# Patient Record
Sex: FEMALE | Race: WHITE | Employment: UNEMPLOYED | URBAN - NONMETROPOLITAN AREA
[De-identification: names, ages, dates, MRNs, and addresses within clinical notes are randomized per-mention and may not be internally consistent; named-entity substitution may affect disease eponyms.]

---

## 2022-11-19 NOTE — PROGRESS NOTES
[address] [address]  Dept: [phone]  Dept Fax: [phone]  Loc: [phone]    Burnie Lanes is a 34 y.o. female who presents today for her medical conditions/complaints as noted below. Chief Complaint   Patient presents with    New Patient     Previous pt of Dr. Juan C Carcamo, pt states she has not seen him in 10 years        HPI:     Patient is here in the office today to establish care. 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 She states that she had a DEXA scan at that time that showed that she had osteoporosis. She is unsure the cause of her osteoporosis. Denies long-term steroid use. She states that she has not had a DEXA scan since that time. Patient also complains of a small white bump near the inner aspect of her left eyelid that has been present for about 3 months. Denies pain, itchiness, or inability to tear on that side. No drainage. She has not tried anything for this. Past Medical History:   Diagnosis Date    Abnormal Pap smear of cervix 01/01/2011    Osteoporosis       History reviewed. No pertinent surgical history.     Family History   Problem Relation Age of Onset    Diabetes Mother     No Known Problems Father     Diabetes Maternal Grandmother     High Blood Pressure Maternal Grandmother        Social History     Tobacco Use    Smoking status: Never    Smokeless tobacco: Never   Substance Use Topics    Alcohol use: No      No current outpatient medications on file. No current facility-administered medications for this visit. No Known Allergies    Health Maintenance   Topic Date Due    COVID-19 Vaccine (1) Never done    Varicella vaccine (1 of 2 - 2-dose childhood series) Never done    HIV screen  Never done    DTaP/Tdap/Td vaccine (1 - Tdap) Never done    Cervical cancer screen  Never done    Flu vaccine (1) Never done    Depression Screen  11/21/2023    Hepatitis C screen  Completed    Hepatitis A vaccine  Aged Out    Hib vaccine  Aged Out    Meningococcal (ACWY) vaccine  Aged Out    Pneumococcal 0-64 years Vaccine  Aged Out       Subjective:      Review of Systems   Constitutional:  Negative for chills and fever. Eyes:  Negative for pain, discharge, itching and visual disturbance. + white bump near inner aspect of left lower eyelid   Respiratory:  Negative for cough and shortness of breath. Cardiovascular:  Negative for chest pain. Gastrointestinal:  Negative for abdominal pain, nausea and vomiting. Neurological:  Negative for dizziness, light-headedness and headaches. Psychiatric/Behavioral:  Negative for dysphoric mood. Objective:     Physical Exam  Vitals and nursing note reviewed. Constitutional:       General: She is not in acute distress. Appearance: Normal appearance. She is well-developed and normal weight. HENT:      Head: Normocephalic and atraumatic. Right Ear: Tympanic membrane, ear canal and external ear normal.      Left Ear: Tympanic membrane, ear canal and external ear normal.      Nose: Nose normal.      Mouth/Throat:      Lips: Pink.       Mouth: Mucous membranes are moist.      Pharynx: Oropharynx is clear. Uvula midline. Eyes:      General: Lids are normal.      Conjunctiva/sclera: Conjunctivae normal.      Pupils: Pupils are equal, round, and reactive to light. Comments: Small white bump present inferior to left nasolacrimal duct without drainage, erythema, or swelling   Neck:      Thyroid: No thyromegaly. Cardiovascular:      Rate and Rhythm: Normal rate and regular rhythm. Heart sounds: Normal heart sounds. No murmur heard. Pulmonary:      Effort: Pulmonary effort is normal.      Breath sounds: Normal breath sounds and air entry. No wheezing, rhonchi or rales. Musculoskeletal:      Cervical back: Neck supple. Lymphadenopathy:      Cervical: No cervical adenopathy. Skin:     General: Skin is warm and dry. Neurological:      General: No focal deficit present. Mental Status: She is alert. Psychiatric:         Mood and Affect: Mood and affect normal.         Behavior: Behavior is cooperative. /60 (Site: Right Upper Arm, Position: Sitting, Cuff Size: Medium Adult)   Pulse 84   Resp 16   Ht 5' 5\" (1.651 m)   Wt 143 lb 7 oz (65.1 kg)   LMP 11/10/2022   SpO2 98%   BMI 23.87 kg/m²     Assessment/Plan:   Nico Jama was seen today for new patient. Diagnoses and all orders for this visit:    Encounter for wellness examination in adult  Comments:  Well-appearing 57-year-old female. Fasting labs ordered as detailed below -- advised to get at earliest convenience. Declines vaccines today (Tdap, influenza). Due for Pap exam -- will need to schedule with our office or OBGYN. Orders:  -     CBC with Auto Differential; Future  -     Comprehensive Metabolic Panel; Future  -     Lipid Panel; Future    History of osteoporosis  Comments:  History of osteoporosis with previous compression fractures at age 20. Etiology unclear at this time. Due for repeat DEXA scan at this time to monitor. No recent fracture/injury noted.  If DEXA continues to show osteoporosis, I will plan to refer for specialty work up. Orders:  -     DEXA BONE DENSITY AXIAL SKELETON; Future    Milia of eyelid of left eye  Comments:  Small milia near left nasolacrimal duct without obstruction. Advised warm compresses as needed. Plan to monitor closely.     Return in about 1 year (around 11/21/2023) for yearly wellness exam.    Electronically signed by Bethany Harris DO on 11/22/2022 at 1:14 PM

## 2022-11-21 ENCOUNTER — OFFICE VISIT (OUTPATIENT)
Dept: FAMILY MEDICINE CLINIC | Age: 32
End: 2022-11-21
Payer: COMMERCIAL

## 2022-11-21 VITALS
HEART RATE: 84 BPM | OXYGEN SATURATION: 98 % | HEIGHT: 65 IN | RESPIRATION RATE: 16 BRPM | WEIGHT: 143.44 LBS | SYSTOLIC BLOOD PRESSURE: 102 MMHG | DIASTOLIC BLOOD PRESSURE: 60 MMHG | BODY MASS INDEX: 23.9 KG/M2

## 2022-11-21 DIAGNOSIS — H02.826 MILIA OF EYELID OF LEFT EYE: ICD-10-CM

## 2022-11-21 DIAGNOSIS — Z87.39 HISTORY OF OSTEOPOROSIS: ICD-10-CM

## 2022-11-21 DIAGNOSIS — Z00.00 ENCOUNTER FOR WELLNESS EXAMINATION IN ADULT: Primary | ICD-10-CM

## 2022-11-21 PROCEDURE — G8484 FLU IMMUNIZE NO ADMIN: HCPCS | Performed by: STUDENT IN AN ORGANIZED HEALTH CARE EDUCATION/TRAINING PROGRAM

## 2022-11-21 PROCEDURE — 99385 PREV VISIT NEW AGE 18-39: CPT | Performed by: STUDENT IN AN ORGANIZED HEALTH CARE EDUCATION/TRAINING PROGRAM

## 2022-11-21 ASSESSMENT — PATIENT HEALTH QUESTIONNAIRE - PHQ9
SUM OF ALL RESPONSES TO PHQ QUESTIONS 1-9: 0
SUM OF ALL RESPONSES TO PHQ QUESTIONS 1-9: 0
2. FEELING DOWN, DEPRESSED OR HOPELESS: 0
SUM OF ALL RESPONSES TO PHQ QUESTIONS 1-9: 0
1. LITTLE INTEREST OR PLEASURE IN DOING THINGS: 0
SUM OF ALL RESPONSES TO PHQ QUESTIONS 1-9: 0
SUM OF ALL RESPONSES TO PHQ9 QUESTIONS 1 & 2: 0

## 2022-11-22 ASSESSMENT — ENCOUNTER SYMPTOMS
EYE PAIN: 0
ABDOMINAL PAIN: 0
EYE DISCHARGE: 0
COUGH: 0
VOMITING: 0
NAUSEA: 0
SHORTNESS OF BREATH: 0
EYE ITCHING: 0

## 2022-12-15 ENCOUNTER — TELEPHONE (OUTPATIENT)
Dept: FAMILY MEDICINE CLINIC | Age: 32
End: 2022-12-15

## 2022-12-15 NOTE — Clinical Note
[address] [address]  Phone: [phone]  Fax: [phone] [facility], DO        December 19, 2022    [address] [place] [address]      Dear Davion Poisson:    ***    If you have any questions or concerns, please don't hesitate to call.     Sincerely,        Rossy Fisher, DO

## 2022-12-15 NOTE — TELEPHONE ENCOUNTER
I will probably need to complete this. Please make sure her 's license or a form of ID is scanned into chart so that I can confirm her official name/etc and let me know when I need to complete this by.  Thanks    Dr. Abdoul Tran

## 2022-12-15 NOTE — LETTER
[address] [facility] [address]  Phone: [phone]  Fax: 444 [facility],         2022     Patient: Taylor Ovalle    YOB: 1990       To Whom It May Concern:    Taylor Orellana. Vasquez,  1990, is currently a patient under my care. Her last office visit with me was on 2022 to establish care. If you have any questions or concerns, please contact me at [phone].     Sincerely,        Tyrone Nix DO

## 2022-12-15 NOTE — TELEPHONE ENCOUNTER
----- Message from Lorelei Crocker MA sent at 12/14/2022 11:52 AM EST -----  Subject: Message to Provider    QUESTIONS  Information for Provider? Pt states she need a letter from the office to   get her social security card. The letter needs to be on a letter head,   full legal name, include last office visit date, date of birth, social   security number if you have it. Original wet signature with title and/or   stamp. Reason for visit and vitals. ---------------------------------------------------------------------------  --------------  Kalen WHITE  [phone]; OK to leave message on voicemail  ---------------------------------------------------------------------------  --------------  SCRIPT ANSWERS  Relationship to Patient?  Self

## 2022-12-16 NOTE — TELEPHONE ENCOUNTER
I am really not sure of another form of ID that she would have. Sounds like the letter just needs to be confirmation that I saw her in the office, which I can write. Let me know when she needs this by and I can complete it when back in the office.  Thanks    Dr. Sulma Sanchez

## 2022-12-16 NOTE — TELEPHONE ENCOUNTER
Spoke with patient- she states that she does not have an ID. That's what she is trying to do - does have a birth [de-identified]. Will bring that in    Is there something else that you want to verify patient?

## 2023-04-24 ENCOUNTER — TELEPHONE (OUTPATIENT)
Dept: FAMILY MEDICINE CLINIC | Age: 33
End: 2023-04-24

## 2023-04-24 ENCOUNTER — HOSPITAL ENCOUNTER (INPATIENT)
Age: 33
LOS: 8 days | Discharge: HOME | DRG: 540 | End: 2023-05-02
Attending: OBSTETRICS & GYNECOLOGY | Admitting: OBSTETRICS & GYNECOLOGY
Payer: COMMERCIAL

## 2023-04-24 DIAGNOSIS — I49.3 PREMATURE VENTRICULAR CONTRACTION: ICD-10-CM

## 2023-04-24 PROBLEM — Z34.90 PREGNANCY WITH FETUS OF UNKNOWN GESTATIONAL AGE: Status: ACTIVE | Noted: 2023-04-24

## 2023-04-24 PROBLEM — Z34.90 PREGNANCY WITH FETUS OF UNKNOWN GESTATIONAL AGE: Status: RESOLVED | Noted: 2023-04-24 | Resolved: 2023-04-24

## 2023-04-24 PROBLEM — Z3A.23 23 WEEKS GESTATION OF PREGNANCY: Status: ACTIVE | Noted: 2023-04-24

## 2023-04-24 PROCEDURE — 87591 N.GONORRHOEAE DNA AMP PROB: CPT

## 2023-04-24 PROCEDURE — 87510 GARDNER VAG DNA DIR PROBE: CPT

## 2023-04-24 PROCEDURE — 87491 CHLMYD TRACH DNA AMP PROBE: CPT

## 2023-04-24 PROCEDURE — 87081 CULTURE SCREEN ONLY: CPT

## 2023-04-24 PROCEDURE — 1220000000 HC SEMI PRIVATE OB R&B

## 2023-04-24 PROCEDURE — 87660 TRICHOMONAS VAGIN DIR PROBE: CPT

## 2023-04-24 PROCEDURE — 87480 CANDIDA DNA DIR PROBE: CPT

## 2023-04-24 RX ORDER — VITAMIN A, ASCORBIC ACID, CHOLECALCIFEROL, .ALPHA.-TOCOPHEROL ACETATE, DL-, THIAMINE MONONITRATE, RIBOFLAVIN, NIACINAMIDE, PYRIDOXINE HYDROCHLORIDE, FOLIC ACID, CYANOCOBALAMIN, CALCIUM CARBONATE, IRON, ZINC OXIDE, AND CUPRIC OXIDE 4000; 120; 400; 22; 1.84; 3; 20; 10; 1; 12; 200; 29; 25; 2 [IU]/1; MG/1; [IU]/1; [IU]/1; MG/1; MG/1; MG/1; MG/1; MG/1; UG/1; MG/1; MG/1; MG/1; MG/1
1 TABLET ORAL DAILY
Status: DISCONTINUED | OUTPATIENT
Start: 2023-04-25 | End: 2023-04-25 | Stop reason: HOSPADM

## 2023-04-24 RX ORDER — AMOXICILLIN 500 MG/1
500 CAPSULE ORAL EVERY 8 HOURS SCHEDULED
Status: DISCONTINUED | OUTPATIENT
Start: 2023-04-27 | End: 2023-04-25

## 2023-04-24 RX ORDER — AZITHROMYCIN 250 MG/1
1000 TABLET, FILM COATED ORAL ONCE
Status: COMPLETED | OUTPATIENT
Start: 2023-04-24 | End: 2023-04-25

## 2023-04-24 RX ORDER — SODIUM CHLORIDE, SODIUM LACTATE, POTASSIUM CHLORIDE, CALCIUM CHLORIDE 600; 310; 30; 20 MG/100ML; MG/100ML; MG/100ML; MG/100ML
INJECTION, SOLUTION INTRAVENOUS CONTINUOUS
Status: DISCONTINUED | OUTPATIENT
Start: 2023-04-24 | End: 2023-04-25

## 2023-04-24 RX ORDER — ACETAMINOPHEN 500 MG
1000 TABLET ORAL EVERY 6 HOURS PRN
Status: DISCONTINUED | OUTPATIENT
Start: 2023-04-24 | End: 2023-04-25 | Stop reason: HOSPADM

## 2023-04-24 RX ORDER — BETAMETHASONE SODIUM PHOSPHATE AND BETAMETHASONE ACETATE 3; 3 MG/ML; MG/ML
12 INJECTION, SUSPENSION INTRA-ARTICULAR; INTRALESIONAL; INTRAMUSCULAR; SOFT TISSUE EVERY 24 HOURS
Status: DISCONTINUED | OUTPATIENT
Start: 2023-04-24 | End: 2023-04-25 | Stop reason: HOSPADM

## 2023-04-24 RX ORDER — SODIUM CHLORIDE, SODIUM LACTATE, POTASSIUM CHLORIDE, AND CALCIUM CHLORIDE .6; .31; .03; .02 G/100ML; G/100ML; G/100ML; G/100ML
1000 INJECTION, SOLUTION INTRAVENOUS PRN
Status: DISCONTINUED | OUTPATIENT
Start: 2023-04-24 | End: 2023-04-25 | Stop reason: HOSPADM

## 2023-04-24 RX ORDER — ONDANSETRON 4 MG/1
4 TABLET, ORALLY DISINTEGRATING ORAL EVERY 8 HOURS PRN
Status: DISCONTINUED | OUTPATIENT
Start: 2023-04-24 | End: 2023-04-24

## 2023-04-24 RX ORDER — ACYCLOVIR 200 MG/1
400 CAPSULE ORAL 3 TIMES DAILY
Status: DISCONTINUED | OUTPATIENT
Start: 2023-04-24 | End: 2023-04-25 | Stop reason: HOSPADM

## 2023-04-24 RX ORDER — ONDANSETRON 2 MG/ML
4 INJECTION INTRAMUSCULAR; INTRAVENOUS EVERY 6 HOURS PRN
Status: DISCONTINUED | OUTPATIENT
Start: 2023-04-24 | End: 2023-04-25 | Stop reason: HOSPADM

## 2023-04-24 RX ORDER — SODIUM CHLORIDE 0.9 % (FLUSH) 0.9 %
5-40 SYRINGE (ML) INJECTION PRN
Status: DISCONTINUED | OUTPATIENT
Start: 2023-04-24 | End: 2023-04-25 | Stop reason: HOSPADM

## 2023-04-24 RX ORDER — SODIUM CHLORIDE 0.9 % (FLUSH) 0.9 %
5-40 SYRINGE (ML) INJECTION EVERY 12 HOURS SCHEDULED
Status: DISCONTINUED | OUTPATIENT
Start: 2023-04-24 | End: 2023-04-25 | Stop reason: HOSPADM

## 2023-04-24 RX ORDER — ONDANSETRON 2 MG/ML
4 INJECTION INTRAMUSCULAR; INTRAVENOUS EVERY 6 HOURS PRN
Status: DISCONTINUED | OUTPATIENT
Start: 2023-04-24 | End: 2023-04-24

## 2023-04-24 RX ORDER — SODIUM CHLORIDE, SODIUM LACTATE, POTASSIUM CHLORIDE, AND CALCIUM CHLORIDE .6; .31; .03; .02 G/100ML; G/100ML; G/100ML; G/100ML
500 INJECTION, SOLUTION INTRAVENOUS PRN
Status: DISCONTINUED | OUTPATIENT
Start: 2023-04-24 | End: 2023-04-25 | Stop reason: HOSPADM

## 2023-04-24 RX ORDER — SODIUM CHLORIDE 9 MG/ML
25 INJECTION, SOLUTION INTRAVENOUS PRN
Status: DISCONTINUED | OUTPATIENT
Start: 2023-04-24 | End: 2023-04-25 | Stop reason: HOSPADM

## 2023-04-25 PROBLEM — O42.912 PREMATURE RUPTURE OF MEMBRANES IN SECOND TRIMESTER: Status: ACTIVE | Noted: 2023-04-25

## 2023-04-25 LAB
ABSOLUTE EOS #: 0.07 K/UL (ref 0–0.44)
ABSOLUTE IMMATURE GRANULOCYTE: 0.05 K/UL (ref 0–0.3)
ABSOLUTE LYMPH #: 2.38 K/UL (ref 1.1–3.7)
ABSOLUTE MONO #: 0.66 K/UL (ref 0.1–1.2)
AMPHETAMINE SCREEN URINE: NEGATIVE
BARBITURATE SCREEN URINE: NEGATIVE
BASOPHILS # BLD: 0 % (ref 0–2)
BASOPHILS ABSOLUTE: 0.04 K/UL (ref 0–0.2)
BENZODIAZEPINE SCREEN, URINE: NEGATIVE
C TRACH DNA SPEC QL PROBE+SIG AMP: NEGATIVE
CANDIDA SPECIES, DNA PROBE: NEGATIVE
CANNABINOID SCREEN URINE: NEGATIVE
COCAINE METABOLITE, URINE: NEGATIVE
EOSINOPHILS RELATIVE PERCENT: 1 % (ref 1–4)
FENTANYL URINE: NEGATIVE
GARDNERELLA VAGINALIS, DNA PROBE: NEGATIVE
HCT VFR BLD AUTO: 39.4 % (ref 36.3–47.1)
HGB BLD-MCNC: 12.9 G/DL (ref 11.9–15.1)
IMMATURE GRANULOCYTES: 0 %
LYMPHOCYTES # BLD: 16 % (ref 24–43)
MCH RBC QN AUTO: 30.3 PG (ref 25.2–33.5)
MCHC RBC AUTO-ENTMCNC: 32.7 G/DL (ref 28.4–34.8)
MCV RBC AUTO: 92.5 FL (ref 82.6–102.9)
METHADONE SCREEN, URINE: NEGATIVE
MONOCYTES # BLD: 5 % (ref 3–12)
N GONORRHOEA DNA SPEC QL PROBE+SIG AMP: NEGATIVE
NRBC AUTOMATED: 0 PER 100 WBC
OPIATES, URINE: NEGATIVE
OXYCODONE SCREEN URINE: NEGATIVE
PDW BLD-RTO: 13.6 % (ref 11.8–14.4)
PHENCYCLIDINE, URINE: NEGATIVE
PLATELET # BLD AUTO: 247 K/UL (ref 138–453)
PMV BLD AUTO: 10.1 FL (ref 8.1–13.5)
RBC # BLD: 4.26 M/UL (ref 3.95–5.11)
SEG NEUTROPHILS: 78 % (ref 36–65)
SEGMENTED NEUTROPHILS ABSOLUTE COUNT: 11.47 K/UL (ref 1.5–8.1)
SOURCE: NORMAL
SPECIMEN DESCRIPTION: NORMAL
T PALLIDUM AB SER QL IA: NONREACTIVE
TEST INFORMATION: NORMAL
TRICHOMONAS VAGINALIS DNA: NEGATIVE
WBC # BLD AUTO: 14.7 K/UL (ref 3.5–11.3)

## 2023-04-25 PROCEDURE — 86780 TREPONEMA PALLIDUM: CPT

## 2023-04-25 PROCEDURE — 80307 DRUG TEST PRSMV CHEM ANLYZR: CPT

## 2023-04-25 PROCEDURE — 2580000003 HC RX 258

## 2023-04-25 PROCEDURE — 2580000003 HC RX 258: Performed by: STUDENT IN AN ORGANIZED HEALTH CARE EDUCATION/TRAINING PROGRAM

## 2023-04-25 PROCEDURE — 86850 RBC ANTIBODY SCREEN: CPT

## 2023-04-25 PROCEDURE — 1220000000 HC SEMI PRIVATE OB R&B

## 2023-04-25 PROCEDURE — 6360000002 HC RX W HCPCS

## 2023-04-25 PROCEDURE — 6370000000 HC RX 637 (ALT 250 FOR IP)

## 2023-04-25 PROCEDURE — 99232 SBSQ HOSP IP/OBS MODERATE 35: CPT | Performed by: OBSTETRICS & GYNECOLOGY

## 2023-04-25 PROCEDURE — 93005 ELECTROCARDIOGRAM TRACING: CPT | Performed by: STUDENT IN AN ORGANIZED HEALTH CARE EDUCATION/TRAINING PROGRAM

## 2023-04-25 PROCEDURE — 99254 IP/OBS CNSLTJ NEW/EST MOD 60: CPT | Performed by: PEDIATRICS

## 2023-04-25 PROCEDURE — 86900 BLOOD TYPING SEROLOGIC ABO: CPT

## 2023-04-25 PROCEDURE — 6360000002 HC RX W HCPCS: Performed by: STUDENT IN AN ORGANIZED HEALTH CARE EDUCATION/TRAINING PROGRAM

## 2023-04-25 PROCEDURE — 87086 URINE CULTURE/COLONY COUNT: CPT

## 2023-04-25 PROCEDURE — 86901 BLOOD TYPING SEROLOGIC RH(D): CPT

## 2023-04-25 PROCEDURE — 85025 COMPLETE CBC W/AUTO DIFF WBC: CPT

## 2023-04-25 PROCEDURE — 6370000000 HC RX 637 (ALT 250 FOR IP): Performed by: STUDENT IN AN ORGANIZED HEALTH CARE EDUCATION/TRAINING PROGRAM

## 2023-04-25 RX ORDER — CALCIUM CARBONATE 200(500)MG
500 TABLET,CHEWABLE ORAL 3 TIMES DAILY PRN
Status: DISCONTINUED | OUTPATIENT
Start: 2023-04-25 | End: 2023-05-02 | Stop reason: HOSPADM

## 2023-04-25 RX ORDER — DIPHENHYDRAMINE HYDROCHLORIDE 50 MG/ML
25 INJECTION INTRAMUSCULAR; INTRAVENOUS EVERY 6 HOURS PRN
Status: DISCONTINUED | OUTPATIENT
Start: 2023-04-25 | End: 2023-04-28

## 2023-04-25 RX ORDER — ACETAMINOPHEN 500 MG
1000 TABLET ORAL EVERY 6 HOURS PRN
Status: DISCONTINUED | OUTPATIENT
Start: 2023-04-25 | End: 2023-05-02 | Stop reason: HOSPADM

## 2023-04-25 RX ORDER — POLYETHYLENE GLYCOL 3350 17 G/17G
17 POWDER, FOR SOLUTION ORAL DAILY PRN
Status: DISCONTINUED | OUTPATIENT
Start: 2023-04-25 | End: 2023-05-02 | Stop reason: HOSPADM

## 2023-04-25 RX ORDER — ONDANSETRON 4 MG/1
4 TABLET, ORALLY DISINTEGRATING ORAL EVERY 8 HOURS PRN
Status: DISCONTINUED | OUTPATIENT
Start: 2023-04-25 | End: 2023-05-02 | Stop reason: HOSPADM

## 2023-04-25 RX ORDER — DOCUSATE SODIUM 100 MG/1
100 CAPSULE, LIQUID FILLED ORAL 2 TIMES DAILY
Status: DISCONTINUED | OUTPATIENT
Start: 2023-04-25 | End: 2023-04-28 | Stop reason: SDUPTHER

## 2023-04-25 RX ORDER — LANOLIN ALCOHOL/MO/W.PET/CERES
3 CREAM (GRAM) TOPICAL NIGHTLY PRN
Status: DISCONTINUED | OUTPATIENT
Start: 2023-04-25 | End: 2023-05-02 | Stop reason: HOSPADM

## 2023-04-25 RX ORDER — LIDOCAINE 4 G/G
1 PATCH TOPICAL DAILY
Status: DISCONTINUED | OUTPATIENT
Start: 2023-04-25 | End: 2023-04-28

## 2023-04-25 RX ORDER — AMOXICILLIN 250 MG/1
250 CAPSULE ORAL EVERY 8 HOURS SCHEDULED
Status: DISCONTINUED | OUTPATIENT
Start: 2023-04-27 | End: 2023-04-25

## 2023-04-25 RX ORDER — ACYCLOVIR 200 MG/1
400 CAPSULE ORAL 3 TIMES DAILY
Status: DISCONTINUED | OUTPATIENT
Start: 2023-04-25 | End: 2023-04-28

## 2023-04-25 RX ORDER — VITAMIN A, ASCORBIC ACID, CHOLECALCIFEROL, .ALPHA.-TOCOPHEROL ACETATE, DL-, THIAMINE MONONITRATE, RIBOFLAVIN, NIACINAMIDE, PYRIDOXINE HYDROCHLORIDE, FOLIC ACID, CYANOCOBALAMIN, CALCIUM CARBONATE, IRON, ZINC OXIDE, AND CUPRIC OXIDE 4000; 120; 400; 22; 1.84; 3; 20; 10; 1; 12; 200; 29; 25; 2 [IU]/1; MG/1; [IU]/1; [IU]/1; MG/1; MG/1; MG/1; MG/1; MG/1; UG/1; MG/1; MG/1; MG/1; MG/1
1 TABLET ORAL DAILY
Status: DISCONTINUED | OUTPATIENT
Start: 2023-04-26 | End: 2023-05-02 | Stop reason: HOSPADM

## 2023-04-25 RX ORDER — AMOXICILLIN 250 MG/1
250 CAPSULE ORAL EVERY 8 HOURS SCHEDULED
Status: DISCONTINUED | OUTPATIENT
Start: 2023-04-26 | End: 2023-04-25 | Stop reason: HOSPADM

## 2023-04-25 RX ORDER — BETAMETHASONE SODIUM PHOSPHATE AND BETAMETHASONE ACETATE 3; 3 MG/ML; MG/ML
12 INJECTION, SUSPENSION INTRA-ARTICULAR; INTRALESIONAL; INTRAMUSCULAR; SOFT TISSUE ONCE
Status: COMPLETED | OUTPATIENT
Start: 2023-04-26 | End: 2023-04-25

## 2023-04-25 RX ORDER — AMOXICILLIN 250 MG/1
250 CAPSULE ORAL EVERY 8 HOURS SCHEDULED
Status: DISCONTINUED | OUTPATIENT
Start: 2023-04-27 | End: 2023-04-28

## 2023-04-25 RX ORDER — ONDANSETRON 2 MG/ML
4 INJECTION INTRAMUSCULAR; INTRAVENOUS EVERY 6 HOURS PRN
Status: DISCONTINUED | OUTPATIENT
Start: 2023-04-25 | End: 2023-05-02 | Stop reason: HOSPADM

## 2023-04-25 RX ADMIN — SODIUM CHLORIDE, POTASSIUM CHLORIDE, SODIUM LACTATE AND CALCIUM CHLORIDE: 600; 310; 30; 20 INJECTION, SOLUTION INTRAVENOUS at 09:09

## 2023-04-25 RX ADMIN — BETAMETHASONE SODIUM PHOSPHATE AND BETAMETHASONE ACETATE 12 MG: 3; 3 INJECTION, SUSPENSION INTRA-ARTICULAR; INTRALESIONAL; INTRAMUSCULAR at 23:29

## 2023-04-25 RX ADMIN — ACYCLOVIR 400 MG: 200 CAPSULE ORAL at 13:50

## 2023-04-25 RX ADMIN — ACYCLOVIR 400 MG: 200 CAPSULE ORAL at 00:05

## 2023-04-25 RX ADMIN — SODIUM CHLORIDE, POTASSIUM CHLORIDE, SODIUM LACTATE AND CALCIUM CHLORIDE: 600; 310; 30; 20 INJECTION, SOLUTION INTRAVENOUS at 00:09

## 2023-04-25 RX ADMIN — AMPICILLIN SODIUM 2000 MG: 2 INJECTION, POWDER, FOR SOLUTION INTRAMUSCULAR; INTRAVENOUS at 00:16

## 2023-04-25 RX ADMIN — ACYCLOVIR 400 MG: 200 CAPSULE ORAL at 20:57

## 2023-04-25 RX ADMIN — Medication 3 MG: at 23:26

## 2023-04-25 RX ADMIN — DOCUSATE SODIUM 100 MG: 100 CAPSULE ORAL at 21:32

## 2023-04-25 RX ADMIN — ACETAMINOPHEN 1000 MG: 500 TABLET ORAL at 17:13

## 2023-04-25 RX ADMIN — ACYCLOVIR 400 MG: 200 CAPSULE ORAL at 09:11

## 2023-04-25 RX ADMIN — BETAMETHASONE SODIUM PHOSPHATE AND BETAMETHASONE ACETATE 12 MG: 3; 3 INJECTION, SUSPENSION INTRA-ARTICULAR; INTRALESIONAL; INTRAMUSCULAR at 00:11

## 2023-04-25 RX ADMIN — Medication 1 TABLET: at 09:10

## 2023-04-25 RX ADMIN — AMPICILLIN SODIUM 2000 MG: 2 INJECTION, POWDER, FOR SOLUTION INTRAMUSCULAR; INTRAVENOUS at 17:33

## 2023-04-25 RX ADMIN — AZITHROMYCIN 1000 MG: 250 TABLET, FILM COATED ORAL at 00:06

## 2023-04-25 RX ADMIN — AMPICILLIN SODIUM 2000 MG: 2 INJECTION, POWDER, FOR SOLUTION INTRAVENOUS at 23:29

## 2023-04-25 RX ADMIN — AMPICILLIN SODIUM 2000 MG: 2 INJECTION, POWDER, FOR SOLUTION INTRAMUSCULAR; INTRAVENOUS at 11:52

## 2023-04-25 RX ADMIN — AMPICILLIN SODIUM 2000 MG: 2 INJECTION, POWDER, FOR SOLUTION INTRAMUSCULAR; INTRAVENOUS at 06:04

## 2023-04-25 ASSESSMENT — PAIN DESCRIPTION - LOCATION: LOCATION: ABDOMEN;BACK

## 2023-04-25 ASSESSMENT — PAIN SCALES - GENERAL: PAINLEVEL_OUTOF10: 4

## 2023-04-25 NOTE — TELEPHONE ENCOUNTER
Received a call from patient at 8:11 pm stating that she is 23 weeks pregnant currently and she believes that her water just broke. I returned patient's call at 8:16 PM.  Explained to patient that I received her message and she said that she thought she had called her OB provider on-call service, not our office. I recommended that she call her OB on-call service ([facility] in [facility]) immediately to discuss her symptoms and hear their recommendations. Patient plans to call them right now -- she has OB office's contact number in her phone. Advised her to keep me updated and contact me with any questions or concerns.     Ruma Dumont, DO

## 2023-04-26 PROBLEM — O42.919 PRETERM PREMATURE RUPTURE OF MEMBRANES (PPROM) WITH UNKNOWN ONSET OF LABOR: Status: ACTIVE | Noted: 2023-04-26

## 2023-04-26 PROBLEM — O09.899 SHORT INTERVAL BETWEEN PREGNANCIES COMPLICATING PREGNANCY, ANTEPARTUM: Status: ACTIVE | Noted: 2023-04-26

## 2023-04-26 PROBLEM — O36.5920 INTRAUTERINE GROWTH RESTRICTION (IUGR) AFFECTING CARE OF MOTHER, SECOND TRIMESTER, SINGLE GESTATION: Status: ACTIVE | Noted: 2023-04-26

## 2023-04-26 PROBLEM — O41.00X0 OLIGOHYDRAMNIOS, ANTEPARTUM: Status: ACTIVE | Noted: 2023-04-26

## 2023-04-26 LAB
ANION GAP SERPL CALCULATED.3IONS-SCNC: 15 MMOL/L (ref 9–17)
BUN SERPL-MCNC: 6 MG/DL (ref 6–20)
CALCIUM SERPL-MCNC: 9.3 MG/DL (ref 8.6–10.4)
CHLORIDE SERPL-SCNC: 101 MMOL/L (ref 98–107)
CO2 SERPL-SCNC: 21 MMOL/L (ref 20–31)
CREAT SERPL-MCNC: 0.52 MG/DL (ref 0.5–0.9)
GFR SERPL CREATININE-BSD FRML MDRD: >60 ML/MIN/1.73M2
GLUCOSE SERPL-MCNC: 155 MG/DL (ref 70–99)
LV EF: 55 %
LVEF MODALITY: NORMAL
MICROORGANISM SPEC CULT: NO GROWTH
POTASSIUM SERPL-SCNC: 4 MMOL/L (ref 3.7–5.3)
SODIUM SERPL-SCNC: 137 MMOL/L (ref 135–144)
SPECIMEN DESCRIPTION: NORMAL
TROPONIN I SERPL DL<=0.01 NG/ML-MCNC: <6 NG/L (ref 0–14)

## 2023-04-26 PROCEDURE — 36415 COLL VENOUS BLD VENIPUNCTURE: CPT

## 2023-04-26 PROCEDURE — 76821 MIDDLE CEREBRAL ARTERY ECHO: CPT | Performed by: OBSTETRICS & GYNECOLOGY

## 2023-04-26 PROCEDURE — 6370000000 HC RX 637 (ALT 250 FOR IP)

## 2023-04-26 PROCEDURE — 1220000000 HC SEMI PRIVATE OB R&B

## 2023-04-26 PROCEDURE — 84484 ASSAY OF TROPONIN QUANT: CPT

## 2023-04-26 PROCEDURE — 6370000000 HC RX 637 (ALT 250 FOR IP): Performed by: STUDENT IN AN ORGANIZED HEALTH CARE EDUCATION/TRAINING PROGRAM

## 2023-04-26 PROCEDURE — 6360000002 HC RX W HCPCS

## 2023-04-26 PROCEDURE — 76820 UMBILICAL ARTERY ECHO: CPT | Performed by: OBSTETRICS & GYNECOLOGY

## 2023-04-26 PROCEDURE — 93306 TTE W/DOPPLER COMPLETE: CPT

## 2023-04-26 PROCEDURE — 99231 SBSQ HOSP IP/OBS SF/LOW 25: CPT | Performed by: STUDENT IN AN ORGANIZED HEALTH CARE EDUCATION/TRAINING PROGRAM

## 2023-04-26 PROCEDURE — 80048 BASIC METABOLIC PNL TOTAL CA: CPT

## 2023-04-26 PROCEDURE — 2580000003 HC RX 258

## 2023-04-26 PROCEDURE — 76811 OB US DETAILED SNGL FETUS: CPT | Performed by: OBSTETRICS & GYNECOLOGY

## 2023-04-26 RX ORDER — ENOXAPARIN SODIUM 100 MG/ML
40 INJECTION SUBCUTANEOUS DAILY
Status: DISCONTINUED | OUTPATIENT
Start: 2023-04-27 | End: 2023-04-28

## 2023-04-26 RX ADMIN — AMPICILLIN SODIUM 2000 MG: 2 INJECTION, POWDER, FOR SOLUTION INTRAVENOUS at 06:16

## 2023-04-26 RX ADMIN — DOCUSATE SODIUM 100 MG: 100 CAPSULE ORAL at 20:49

## 2023-04-26 RX ADMIN — Medication 1 TABLET: at 09:45

## 2023-04-26 RX ADMIN — ACYCLOVIR 400 MG: 200 CAPSULE ORAL at 20:49

## 2023-04-26 RX ADMIN — ACYCLOVIR 400 MG: 200 CAPSULE ORAL at 09:45

## 2023-04-26 RX ADMIN — DOCUSATE SODIUM 100 MG: 100 CAPSULE ORAL at 09:45

## 2023-04-26 RX ADMIN — ACYCLOVIR 400 MG: 200 CAPSULE ORAL at 13:10

## 2023-04-26 RX ADMIN — AMPICILLIN SODIUM 2000 MG: 2 INJECTION, POWDER, FOR SOLUTION INTRAVENOUS at 14:20

## 2023-04-26 RX ADMIN — AMPICILLIN SODIUM 2000 MG: 2 INJECTION, POWDER, FOR SOLUTION INTRAVENOUS at 20:55

## 2023-04-26 ASSESSMENT — PAIN SCALES - GENERAL: PAINLEVEL_OUTOF10: 0

## 2023-04-27 LAB
ABSOLUTE EOS #: <0.03 K/UL (ref 0–0.44)
ABSOLUTE IMMATURE GRANULOCYTE: 0.08 K/UL (ref 0–0.3)
ABSOLUTE IMMATURE GRANULOCYTE: 0.09 K/UL (ref 0–0.3)
ABSOLUTE IMMATURE GRANULOCYTE: 0.12 K/UL (ref 0–0.3)
ABSOLUTE LYMPH #: 1.85 K/UL (ref 1.1–3.7)
ABSOLUTE LYMPH #: 2.04 K/UL (ref 1.1–3.7)
ABSOLUTE LYMPH #: 2.2 K/UL (ref 1.1–3.7)
ABSOLUTE MONO #: 0.64 K/UL (ref 0.1–1.2)
ABSOLUTE MONO #: 0.78 K/UL (ref 0.1–1.2)
ABSOLUTE MONO #: 0.88 K/UL (ref 0.1–1.2)
BASOPHILS # BLD: 0 % (ref 0–2)
BASOPHILS ABSOLUTE: 0.03 K/UL (ref 0–0.2)
BASOPHILS ABSOLUTE: 0.04 K/UL (ref 0–0.2)
BASOPHILS ABSOLUTE: 0.04 K/UL (ref 0–0.2)
EKG ATRIAL RATE: 80 BPM
EKG P AXIS: 54 DEGREES
EKG P-R INTERVAL: 132 MS
EKG Q-T INTERVAL: 390 MS
EKG QRS DURATION: 82 MS
EKG QTC CALCULATION (BAZETT): 449 MS
EKG R AXIS: 28 DEGREES
EKG T AXIS: 3 DEGREES
EKG VENTRICULAR RATE: 80 BPM
EOSINOPHILS RELATIVE PERCENT: 0 % (ref 1–4)
FIBRINOGEN: 585 MG/DL (ref 203–521)
FIBRINOGEN: 592 MG/DL (ref 203–521)
HCT VFR BLD AUTO: 31.5 % (ref 36.3–47.1)
HCT VFR BLD AUTO: 34.1 % (ref 36.3–47.1)
HCT VFR BLD AUTO: 36.1 % (ref 36.3–47.1)
HGB BLD-MCNC: 10.1 G/DL (ref 11.9–15.1)
HGB BLD-MCNC: 10.8 G/DL (ref 11.9–15.1)
HGB BLD-MCNC: 11.6 G/DL (ref 11.9–15.1)
IMMATURE GRANULOCYTES: 1 %
INR PPP: 1.1
INR PPP: 1.2
LYMPHOCYTES # BLD: 13 % (ref 24–43)
LYMPHOCYTES # BLD: 14 % (ref 24–43)
LYMPHOCYTES # BLD: 16 % (ref 24–43)
MCH RBC QN AUTO: 30 PG (ref 25.2–33.5)
MCH RBC QN AUTO: 30.4 PG (ref 25.2–33.5)
MCH RBC QN AUTO: 30.8 PG (ref 25.2–33.5)
MCHC RBC AUTO-ENTMCNC: 31.7 G/DL (ref 28.4–34.8)
MCHC RBC AUTO-ENTMCNC: 32.1 G/DL (ref 28.4–34.8)
MCHC RBC AUTO-ENTMCNC: 32.1 G/DL (ref 28.4–34.8)
MCV RBC AUTO: 94.7 FL (ref 82.6–102.9)
MCV RBC AUTO: 94.8 FL (ref 82.6–102.9)
MCV RBC AUTO: 96 FL (ref 82.6–102.9)
MICROORGANISM SPEC CULT: NORMAL
MONOCYTES # BLD: 5 % (ref 3–12)
MONOCYTES # BLD: 6 % (ref 3–12)
MONOCYTES # BLD: 6 % (ref 3–12)
NRBC AUTOMATED: 0 PER 100 WBC
PARTIAL THROMBOPLASTIN TIME: 31.7 SEC (ref 23–36.5)
PARTIAL THROMBOPLASTIN TIME: 39.4 SEC (ref 23–36.5)
PDW BLD-RTO: 13.2 % (ref 11.8–14.4)
PDW BLD-RTO: 13.3 % (ref 11.8–14.4)
PDW BLD-RTO: 13.3 % (ref 11.8–14.4)
PLATELET # BLD AUTO: 220 K/UL (ref 138–453)
PLATELET # BLD AUTO: 237 K/UL (ref 138–453)
PLATELET # BLD AUTO: 249 K/UL (ref 138–453)
PMV BLD AUTO: 10.1 FL (ref 8.1–13.5)
PROTHROMBIN TIME: 14.4 SEC (ref 11.7–14.9)
PROTHROMBIN TIME: 14.9 SEC (ref 11.7–14.9)
RBC # BLD: 3.28 M/UL (ref 3.95–5.11)
RBC # BLD: 3.6 M/UL (ref 3.95–5.11)
RBC # BLD: 3.81 M/UL (ref 3.95–5.11)
SEG NEUTROPHILS: 77 % (ref 36–65)
SEG NEUTROPHILS: 80 % (ref 36–65)
SEG NEUTROPHILS: 80 % (ref 36–65)
SEGMENTED NEUTROPHILS ABSOLUTE COUNT: 10.89 K/UL (ref 1.5–8.1)
SEGMENTED NEUTROPHILS ABSOLUTE COUNT: 11.06 K/UL (ref 1.5–8.1)
SEGMENTED NEUTROPHILS ABSOLUTE COUNT: 12.53 K/UL (ref 1.5–8.1)
SEND OUT REPORT: NORMAL
SPECIMEN DESCRIPTION: NORMAL
TEST NAME: NORMAL
WBC # BLD AUTO: 13.7 K/UL (ref 3.5–11.3)
WBC # BLD AUTO: 14 K/UL (ref 3.5–11.3)
WBC # BLD AUTO: 15.6 K/UL (ref 3.5–11.3)

## 2023-04-27 PROCEDURE — 86900 BLOOD TYPING SEROLOGIC ABO: CPT

## 2023-04-27 PROCEDURE — 6370000000 HC RX 637 (ALT 250 FOR IP)

## 2023-04-27 PROCEDURE — 93010 ELECTROCARDIOGRAM REPORT: CPT | Performed by: INTERNAL MEDICINE

## 2023-04-27 PROCEDURE — 85025 COMPLETE CBC W/AUTO DIFF WBC: CPT

## 2023-04-27 PROCEDURE — 85384 FIBRINOGEN ACTIVITY: CPT

## 2023-04-27 PROCEDURE — 86901 BLOOD TYPING SEROLOGIC RH(D): CPT

## 2023-04-27 PROCEDURE — 1220000000 HC SEMI PRIVATE OB R&B

## 2023-04-27 PROCEDURE — 36415 COLL VENOUS BLD VENIPUNCTURE: CPT

## 2023-04-27 PROCEDURE — 6360000002 HC RX W HCPCS: Performed by: STUDENT IN AN ORGANIZED HEALTH CARE EDUCATION/TRAINING PROGRAM

## 2023-04-27 PROCEDURE — 6370000000 HC RX 637 (ALT 250 FOR IP): Performed by: STUDENT IN AN ORGANIZED HEALTH CARE EDUCATION/TRAINING PROGRAM

## 2023-04-27 PROCEDURE — 85730 THROMBOPLASTIN TIME PARTIAL: CPT

## 2023-04-27 PROCEDURE — 86850 RBC ANTIBODY SCREEN: CPT

## 2023-04-27 PROCEDURE — 85610 PROTHROMBIN TIME: CPT

## 2023-04-27 RX ORDER — MAGNESIUM SULFATE HEPTAHYDRATE 40 MG/ML
4000 INJECTION, SOLUTION INTRAVENOUS ONCE
Status: COMPLETED | OUTPATIENT
Start: 2023-04-27 | End: 2023-04-27

## 2023-04-27 RX ADMIN — ACETAMINOPHEN 1000 MG: 500 TABLET ORAL at 22:48

## 2023-04-27 RX ADMIN — MAGNESIUM SULFATE HEPTAHYDRATE 4000 MG: 40 INJECTION, SOLUTION INTRAVENOUS at 10:41

## 2023-04-27 RX ADMIN — AMOXICILLIN 250 MG: 250 CAPSULE ORAL at 03:09

## 2023-04-27 RX ADMIN — ACETAMINOPHEN 1000 MG: 500 TABLET ORAL at 09:39

## 2023-04-27 RX ADMIN — DOCUSATE SODIUM 100 MG: 100 CAPSULE ORAL at 22:45

## 2023-04-27 RX ADMIN — AMOXICILLIN 250 MG: 250 CAPSULE ORAL at 22:45

## 2023-04-27 RX ADMIN — ACYCLOVIR 400 MG: 200 CAPSULE ORAL at 22:46

## 2023-04-27 ASSESSMENT — PAIN SCALES - GENERAL
PAINLEVEL_OUTOF10: 5
PAINLEVEL_OUTOF10: 2
PAINLEVEL_OUTOF10: 1

## 2023-04-27 ASSESSMENT — PAIN DESCRIPTION - DESCRIPTORS
DESCRIPTORS: CRAMPING
DESCRIPTORS: CRAMPING

## 2023-04-27 ASSESSMENT — PAIN DESCRIPTION - LOCATION
LOCATION: ABDOMEN
LOCATION: ABDOMEN

## 2023-04-28 ENCOUNTER — ANESTHESIA EVENT (OUTPATIENT)
Dept: LABOR AND DELIVERY | Age: 33
End: 2023-04-28
Payer: COMMERCIAL

## 2023-04-28 ENCOUNTER — ANESTHESIA (OUTPATIENT)
Dept: LABOR AND DELIVERY | Age: 33
End: 2023-04-28
Payer: COMMERCIAL

## 2023-04-28 LAB
ABO/RH: NORMAL
ABO/RH: NORMAL
ANION GAP SERPL CALCULATED.3IONS-SCNC: 12 MMOL/L (ref 9–17)
ANTIBODY SCREEN: NEGATIVE
ANTIBODY SCREEN: NEGATIVE
ARM BAND NUMBER: NORMAL
ARM BAND NUMBER: NORMAL
BUN SERPL-MCNC: 10 MG/DL (ref 6–20)
CALCIUM SERPL-MCNC: 7.9 MG/DL (ref 8.6–10.4)
CHLORIDE SERPL-SCNC: 106 MMOL/L (ref 98–107)
CO2 SERPL-SCNC: 20 MMOL/L (ref 20–31)
CREAT SERPL-MCNC: 0.43 MG/DL (ref 0.5–0.9)
EXPIRATION DATE: NORMAL
EXPIRATION DATE: NORMAL
GFR SERPL CREATININE-BSD FRML MDRD: >60 ML/MIN/1.73M2
GLUCOSE SERPL-MCNC: 74 MG/DL (ref 70–99)
MAGNESIUM SERPL-MCNC: 1.9 MG/DL (ref 1.6–2.6)
POTASSIUM SERPL-SCNC: 3.8 MMOL/L (ref 3.7–5.3)
SODIUM SERPL-SCNC: 138 MMOL/L (ref 135–144)
TSH SERPL-ACNC: 2.17 UIU/ML (ref 0.3–5)

## 2023-04-28 PROCEDURE — 3700000000 HC ANESTHESIA ATTENDED CARE: Performed by: OBSTETRICS & GYNECOLOGY

## 2023-04-28 PROCEDURE — 6370000000 HC RX 637 (ALT 250 FOR IP): Performed by: STUDENT IN AN ORGANIZED HEALTH CARE EDUCATION/TRAINING PROGRAM

## 2023-04-28 PROCEDURE — 2580000003 HC RX 258

## 2023-04-28 PROCEDURE — 3609079900 HC CESAREAN SECTION: Performed by: OBSTETRICS & GYNECOLOGY

## 2023-04-28 PROCEDURE — 6370000000 HC RX 637 (ALT 250 FOR IP)

## 2023-04-28 PROCEDURE — 1220000000 HC SEMI PRIVATE OB R&B

## 2023-04-28 PROCEDURE — 3700000001 HC ADD 15 MINUTES (ANESTHESIA): Performed by: OBSTETRICS & GYNECOLOGY

## 2023-04-28 PROCEDURE — 6360000002 HC RX W HCPCS

## 2023-04-28 PROCEDURE — 2500000003 HC RX 250 WO HCPCS

## 2023-04-28 PROCEDURE — A4216 STERILE WATER/SALINE, 10 ML: HCPCS

## 2023-04-28 PROCEDURE — 88305 TISSUE EXAM BY PATHOLOGIST: CPT

## 2023-04-28 PROCEDURE — 84443 ASSAY THYROID STIM HORMONE: CPT

## 2023-04-28 PROCEDURE — 6360000002 HC RX W HCPCS: Performed by: ANESTHESIOLOGY

## 2023-04-28 PROCEDURE — 7100000001 HC PACU RECOVERY - ADDTL 15 MIN: Performed by: OBSTETRICS & GYNECOLOGY

## 2023-04-28 PROCEDURE — 7100000000 HC PACU RECOVERY - FIRST 15 MIN: Performed by: OBSTETRICS & GYNECOLOGY

## 2023-04-28 PROCEDURE — 6370000000 HC RX 637 (ALT 250 FOR IP): Performed by: OBSTETRICS & GYNECOLOGY

## 2023-04-28 PROCEDURE — 2709999900 HC NON-CHARGEABLE SUPPLY: Performed by: OBSTETRICS & GYNECOLOGY

## 2023-04-28 PROCEDURE — 2500000003 HC RX 250 WO HCPCS: Performed by: ANESTHESIOLOGY

## 2023-04-28 PROCEDURE — 59514 CESAREAN DELIVERY ONLY: CPT | Performed by: OBSTETRICS & GYNECOLOGY

## 2023-04-28 PROCEDURE — 80048 BASIC METABOLIC PNL TOTAL CA: CPT

## 2023-04-28 PROCEDURE — 6360000002 HC RX W HCPCS: Performed by: OBSTETRICS & GYNECOLOGY

## 2023-04-28 PROCEDURE — 83735 ASSAY OF MAGNESIUM: CPT

## 2023-04-28 PROCEDURE — 36415 COLL VENOUS BLD VENIPUNCTURE: CPT

## 2023-04-28 RX ORDER — ONDANSETRON 2 MG/ML
4 INJECTION INTRAMUSCULAR; INTRAVENOUS EVERY 6 HOURS PRN
Status: DISCONTINUED | OUTPATIENT
Start: 2023-04-28 | End: 2023-04-28 | Stop reason: SDUPTHER

## 2023-04-28 RX ORDER — KETOROLAC TROMETHAMINE 30 MG/ML
INJECTION, SOLUTION INTRAMUSCULAR; INTRAVENOUS
Status: COMPLETED
Start: 2023-04-28 | End: 2023-04-28

## 2023-04-28 RX ORDER — SODIUM CHLORIDE, SODIUM LACTATE, POTASSIUM CHLORIDE, CALCIUM CHLORIDE 600; 310; 30; 20 MG/100ML; MG/100ML; MG/100ML; MG/100ML
INJECTION, SOLUTION INTRAVENOUS CONTINUOUS
Status: DISCONTINUED | OUTPATIENT
Start: 2023-04-28 | End: 2023-04-28

## 2023-04-28 RX ORDER — OXYCODONE HYDROCHLORIDE 5 MG/1
5 TABLET ORAL EVERY 6 HOURS PRN
Qty: 28 TABLET | Refills: 0 | Status: SHIPPED | OUTPATIENT
Start: 2023-04-28 | End: 2023-05-05

## 2023-04-28 RX ORDER — BUPIVACAINE HYDROCHLORIDE 7.5 MG/ML
INJECTION, SOLUTION INTRASPINAL
Status: COMPLETED | OUTPATIENT
Start: 2023-04-28 | End: 2023-04-28

## 2023-04-28 RX ORDER — MIDAZOLAM HYDROCHLORIDE 1 MG/ML
INJECTION INTRAMUSCULAR; INTRAVENOUS PRN
Status: DISCONTINUED | OUTPATIENT
Start: 2023-04-28 | End: 2023-04-28 | Stop reason: SDUPTHER

## 2023-04-28 RX ORDER — DOCUSATE SODIUM 100 MG/1
100 CAPSULE, LIQUID FILLED ORAL 2 TIMES DAILY
Status: DISCONTINUED | OUTPATIENT
Start: 2023-04-28 | End: 2023-05-02 | Stop reason: HOSPADM

## 2023-04-28 RX ORDER — SODIUM CHLORIDE 0.9 % (FLUSH) 0.9 %
5-40 SYRINGE (ML) INJECTION EVERY 12 HOURS SCHEDULED
Status: DISCONTINUED | OUTPATIENT
Start: 2023-04-28 | End: 2023-05-02 | Stop reason: HOSPADM

## 2023-04-28 RX ORDER — ENOXAPARIN SODIUM 100 MG/ML
40 INJECTION SUBCUTANEOUS DAILY
Status: DISCONTINUED | OUTPATIENT
Start: 2023-04-28 | End: 2023-04-28

## 2023-04-28 RX ORDER — SIMETHICONE 80 MG
80 TABLET,CHEWABLE ORAL 4 TIMES DAILY PRN
Qty: 90 TABLET | Refills: 1 | Status: SHIPPED | OUTPATIENT
Start: 2023-04-28

## 2023-04-28 RX ORDER — IBUPROFEN 600 MG/1
600 TABLET ORAL EVERY 6 HOURS PRN
Qty: 40 TABLET | Refills: 1 | Status: SHIPPED | OUTPATIENT
Start: 2023-04-28

## 2023-04-28 RX ORDER — ACETAMINOPHEN 500 MG
1000 TABLET ORAL EVERY 6 HOURS PRN
Qty: 60 TABLET | Refills: 1 | Status: SHIPPED | OUTPATIENT
Start: 2023-04-28

## 2023-04-28 RX ORDER — ACETAMINOPHEN 500 MG
1000 TABLET ORAL EVERY 8 HOURS SCHEDULED
Status: DISCONTINUED | OUTPATIENT
Start: 2023-04-28 | End: 2023-04-28

## 2023-04-28 RX ORDER — MAGNESIUM SULFATE HEPTAHYDRATE 40 MG/ML
4000 INJECTION, SOLUTION INTRAVENOUS ONCE
Status: DISCONTINUED | OUTPATIENT
Start: 2023-04-28 | End: 2023-04-28

## 2023-04-28 RX ORDER — EPHEDRINE SULFATE/0.9% NACL/PF 50 MG/5 ML
SYRINGE (ML) INTRAVENOUS PRN
Status: DISCONTINUED | OUTPATIENT
Start: 2023-04-28 | End: 2023-04-28 | Stop reason: SDUPTHER

## 2023-04-28 RX ORDER — GLYCOPYRROLATE 0.2 MG/ML
INJECTION INTRAMUSCULAR; INTRAVENOUS PRN
Status: DISCONTINUED | OUTPATIENT
Start: 2023-04-28 | End: 2023-04-28 | Stop reason: SDUPTHER

## 2023-04-28 RX ORDER — ACETAMINOPHEN 500 MG
1000 TABLET ORAL EVERY 6 HOURS
Status: DISCONTINUED | OUTPATIENT
Start: 2023-04-28 | End: 2023-05-02 | Stop reason: HOSPADM

## 2023-04-28 RX ORDER — OXYCODONE HYDROCHLORIDE 5 MG/1
5 TABLET ORAL EVERY 4 HOURS PRN
Status: DISCONTINUED | OUTPATIENT
Start: 2023-04-28 | End: 2023-05-02 | Stop reason: HOSPADM

## 2023-04-28 RX ORDER — ONDANSETRON 2 MG/ML
4 INJECTION INTRAMUSCULAR; INTRAVENOUS EVERY 6 HOURS PRN
Status: DISCONTINUED | OUTPATIENT
Start: 2023-04-28 | End: 2023-04-28

## 2023-04-28 RX ORDER — ACETAMINOPHEN 325 MG/1
975 TABLET ORAL ONCE
Status: DISCONTINUED | OUTPATIENT
Start: 2023-04-28 | End: 2023-04-28 | Stop reason: SDUPTHER

## 2023-04-28 RX ORDER — OXYCODONE HYDROCHLORIDE 5 MG/1
10 TABLET ORAL EVERY 4 HOURS PRN
Status: DISCONTINUED | OUTPATIENT
Start: 2023-04-28 | End: 2023-05-02 | Stop reason: HOSPADM

## 2023-04-28 RX ORDER — LANOLIN 72 %
OINTMENT (GRAM) TOPICAL
Status: DISCONTINUED | OUTPATIENT
Start: 2023-04-28 | End: 2023-05-02 | Stop reason: HOSPADM

## 2023-04-28 RX ORDER — SODIUM CHLORIDE 0.9 % (FLUSH) 0.9 %
5-40 SYRINGE (ML) INJECTION EVERY 12 HOURS SCHEDULED
Status: DISCONTINUED | OUTPATIENT
Start: 2023-04-28 | End: 2023-04-28

## 2023-04-28 RX ORDER — SODIUM CHLORIDE, SODIUM LACTATE, POTASSIUM CHLORIDE, AND CALCIUM CHLORIDE .6; .31; .03; .02 G/100ML; G/100ML; G/100ML; G/100ML
1000 INJECTION, SOLUTION INTRAVENOUS ONCE
Status: COMPLETED | OUTPATIENT
Start: 2023-04-28 | End: 2023-04-28

## 2023-04-28 RX ORDER — IBUPROFEN 600 MG/1
600 TABLET ORAL EVERY 6 HOURS
Status: DISCONTINUED | OUTPATIENT
Start: 2023-04-29 | End: 2023-05-02 | Stop reason: HOSPADM

## 2023-04-28 RX ORDER — NALOXONE HYDROCHLORIDE 0.4 MG/ML
INJECTION, SOLUTION INTRAMUSCULAR; INTRAVENOUS; SUBCUTANEOUS PRN
Status: DISCONTINUED | OUTPATIENT
Start: 2023-04-28 | End: 2023-04-28

## 2023-04-28 RX ORDER — SODIUM CHLORIDE 9 MG/ML
INJECTION, SOLUTION INTRAVENOUS PRN
Status: DISCONTINUED | OUTPATIENT
Start: 2023-04-28 | End: 2023-05-02 | Stop reason: HOSPADM

## 2023-04-28 RX ORDER — SODIUM CHLORIDE 9 MG/ML
INJECTION, SOLUTION INTRAVENOUS PRN
Status: DISCONTINUED | OUTPATIENT
Start: 2023-04-28 | End: 2023-04-28

## 2023-04-28 RX ORDER — IBUPROFEN 600 MG/1
600 TABLET ORAL EVERY 8 HOURS SCHEDULED
Status: DISCONTINUED | OUTPATIENT
Start: 2023-04-28 | End: 2023-04-28

## 2023-04-28 RX ORDER — MAGNESIUM SULFATE HEPTAHYDRATE 40 MG/ML
INJECTION, SOLUTION INTRAVENOUS
Status: DISCONTINUED
Start: 2023-04-28 | End: 2023-04-28

## 2023-04-28 RX ORDER — PHENYLEPHRINE HCL IN 0.9% NACL 1 MG/10 ML
SYRINGE (ML) INTRAVENOUS PRN
Status: DISCONTINUED | OUTPATIENT
Start: 2023-04-28 | End: 2023-04-28 | Stop reason: SDUPTHER

## 2023-04-28 RX ORDER — SODIUM CHLORIDE 0.9 % (FLUSH) 0.9 %
5-40 SYRINGE (ML) INJECTION PRN
Status: DISCONTINUED | OUTPATIENT
Start: 2023-04-28 | End: 2023-04-28

## 2023-04-28 RX ORDER — SODIUM CHLORIDE 0.9 % (FLUSH) 0.9 %
10 SYRINGE (ML) INJECTION PRN
Status: DISCONTINUED | OUTPATIENT
Start: 2023-04-28 | End: 2023-04-28

## 2023-04-28 RX ORDER — ONDANSETRON 4 MG/1
4 TABLET, FILM COATED ORAL 3 TIMES DAILY PRN
Qty: 15 TABLET | Refills: 0 | Status: SHIPPED | OUTPATIENT
Start: 2023-04-28

## 2023-04-28 RX ORDER — TRISODIUM CITRATE DIHYDRATE AND CITRIC ACID MONOHYDRATE 500; 334 MG/5ML; MG/5ML
30 SOLUTION ORAL ONCE
Status: DISCONTINUED | OUTPATIENT
Start: 2023-04-28 | End: 2023-04-28

## 2023-04-28 RX ORDER — SENNA PLUS 8.6 MG/1
1 TABLET ORAL 2 TIMES DAILY
Qty: 60 TABLET | Refills: 1 | Status: SHIPPED | OUTPATIENT
Start: 2023-04-28 | End: 2023-06-27

## 2023-04-28 RX ORDER — MORPHINE SULFATE 1 MG/ML
INJECTION, SOLUTION EPIDURAL; INTRATHECAL; INTRAVENOUS PRN
Status: DISCONTINUED | OUTPATIENT
Start: 2023-04-28 | End: 2023-04-28 | Stop reason: SDUPTHER

## 2023-04-28 RX ORDER — SODIUM CHLORIDE 0.9 % (FLUSH) 0.9 %
5-40 SYRINGE (ML) INJECTION PRN
Status: DISCONTINUED | OUTPATIENT
Start: 2023-04-28 | End: 2023-05-02 | Stop reason: HOSPADM

## 2023-04-28 RX ORDER — KETOROLAC TROMETHAMINE 30 MG/ML
30 INJECTION, SOLUTION INTRAMUSCULAR; INTRAVENOUS EVERY 6 HOURS
Status: DISPENSED | OUTPATIENT
Start: 2023-04-28 | End: 2023-04-29

## 2023-04-28 RX ORDER — TRANEXAMIC ACID 10 MG/ML
1000 INJECTION, SOLUTION INTRAVENOUS ONCE
Status: DISCONTINUED | OUTPATIENT
Start: 2023-04-28 | End: 2023-04-30

## 2023-04-28 RX ORDER — ENOXAPARIN SODIUM 100 MG/ML
40 INJECTION SUBCUTANEOUS DAILY
Status: DISCONTINUED | OUTPATIENT
Start: 2023-04-29 | End: 2023-04-28 | Stop reason: SDUPTHER

## 2023-04-28 RX ORDER — CALCIUM GLUCONATE 94 MG/ML
1000 INJECTION, SOLUTION INTRAVENOUS PRN
Status: DISCONTINUED | OUTPATIENT
Start: 2023-04-28 | End: 2023-04-28

## 2023-04-28 RX ADMIN — Medication 2000 MG: at 11:30

## 2023-04-28 RX ADMIN — Medication 166.7 ML: at 11:57

## 2023-04-28 RX ADMIN — SODIUM CHLORIDE, POTASSIUM CHLORIDE, SODIUM LACTATE AND CALCIUM CHLORIDE: 600; 310; 30; 20 INJECTION, SOLUTION INTRAVENOUS at 11:33

## 2023-04-28 RX ADMIN — Medication 500 MG: at 11:33

## 2023-04-28 RX ADMIN — Medication 100 MCG: at 12:18

## 2023-04-28 RX ADMIN — OXYCODONE HYDROCHLORIDE 10 MG: 5 TABLET ORAL at 19:44

## 2023-04-28 RX ADMIN — ACYCLOVIR 400 MG: 200 CAPSULE ORAL at 09:00

## 2023-04-28 RX ADMIN — MORPHINE SULFATE 0.2 MG: 1 INJECTION, SOLUTION EPIDURAL; INTRATHECAL; INTRAVENOUS at 11:42

## 2023-04-28 RX ADMIN — Medication 10 MG: at 12:21

## 2023-04-28 RX ADMIN — FAMOTIDINE 20 MG: 10 INJECTION, SOLUTION INTRAVENOUS at 11:31

## 2023-04-28 RX ADMIN — Medication 100 MCG: at 12:21

## 2023-04-28 RX ADMIN — GLYCOPYRROLATE 0.2 MG: 0.2 INJECTION INTRAMUSCULAR; INTRAVENOUS at 12:13

## 2023-04-28 RX ADMIN — Medication 1 TABLET: at 09:00

## 2023-04-28 RX ADMIN — KETOROLAC TROMETHAMINE 30 MG: 30 INJECTION, SOLUTION INTRAMUSCULAR at 13:26

## 2023-04-28 RX ADMIN — OXYCODONE HYDROCHLORIDE 10 MG: 5 TABLET ORAL at 13:27

## 2023-04-28 RX ADMIN — DOCUSATE SODIUM 100 MG: 100 CAPSULE ORAL at 09:00

## 2023-04-28 RX ADMIN — PHENYLEPHRINE HYDROCHLORIDE 50 MCG/MIN: 10 INJECTION INTRAVENOUS at 11:44

## 2023-04-28 RX ADMIN — MAGNESIUM SULFATE IN WATER 4000 MG: 40 INJECTION, SOLUTION INTRAVENOUS at 10:45

## 2023-04-28 RX ADMIN — Medication 100 MCG: at 12:20

## 2023-04-28 RX ADMIN — ONDANSETRON 4 MG: 2 INJECTION INTRAMUSCULAR; INTRAVENOUS at 11:36

## 2023-04-28 RX ADMIN — AMOXICILLIN 250 MG: 250 CAPSULE ORAL at 05:35

## 2023-04-28 RX ADMIN — BUPIVACAINE HYDROCHLORIDE IN DEXTROSE 12 MG: 7.5 INJECTION, SOLUTION SUBARACHNOID at 11:42

## 2023-04-28 RX ADMIN — KETOROLAC TROMETHAMINE 30 MG: 30 INJECTION, SOLUTION INTRAMUSCULAR; INTRAVENOUS at 13:26

## 2023-04-28 RX ADMIN — SODIUM CHLORIDE, POTASSIUM CHLORIDE, SODIUM LACTATE AND CALCIUM CHLORIDE 1000 ML: 600; 310; 30; 20 INJECTION, SOLUTION INTRAVENOUS at 11:32

## 2023-04-28 RX ADMIN — SODIUM CHLORIDE, POTASSIUM CHLORIDE, SODIUM LACTATE AND CALCIUM CHLORIDE: 600; 310; 30; 20 INJECTION, SOLUTION INTRAVENOUS at 11:30

## 2023-04-28 RX ADMIN — Medication 909 ML/HR: at 11:56

## 2023-04-28 RX ADMIN — ACETAMINOPHEN 1000 MG: 500 TABLET ORAL at 23:43

## 2023-04-28 RX ADMIN — MIDAZOLAM 1 MG: 1 INJECTION INTRAMUSCULAR; INTRAVENOUS at 12:16

## 2023-04-28 RX ADMIN — KETOROLAC TROMETHAMINE 30 MG: 30 INJECTION, SOLUTION INTRAMUSCULAR; INTRAVENOUS at 20:16

## 2023-04-28 RX ADMIN — DOCUSATE SODIUM 100 MG: 100 CAPSULE ORAL at 19:44

## 2023-04-28 ASSESSMENT — PAIN SCALES - GENERAL
PAINLEVEL_OUTOF10: 4
PAINLEVEL_OUTOF10: 7

## 2023-04-28 NOTE — ANESTHESIA PRE PROCEDURE
Department of Anesthesiology  Preprocedure Note       Name:  Talmadge Skiff   Age:  28 y.o.  :  1990                                          MRN:  [id number]         Date:  2023      Surgeon: Get Perez):  Jose Green MD    Procedure: Procedure(s):   SECTION    Medications prior to admission:   Prior to Admission medications    Not on File       Current medications:    Current Facility-Administered Medications   Medication Dose Route Frequency Provider Last Rate Last Admin    oxytocin (PITOCIN) 30 units in 500 mL infusion  87.3 maryann-units/min IntraVENous Continuous PRN Caroline Tony MD        And    oxytocin (PITOCIN) 10 unit bolus from the bag  10 Units IntraVENous PRN Caroline Tony MD       Magalie Young [START ON 2023] enoxaparin (LOVENOX) injection 40 mg  40 mg SubCUTAneous Daily Susy Mccormack, DO        magnesium sulfate 4 GM/100ML infusion             lactated ringers IV soln infusion   IntraVENous Continuous Sanaz Margob, DO        lactated ringers bolus  1,000 mL IntraVENous Once Sanaz Margob, DO        sodium chloride flush 0.9 % injection 10 mL  10 mL IntraVENous PRN Sanaz Margob, DO        0.9 % sodium chloride infusion   IntraVENous PRN Sanaz Margob, DO        citric acid-sodium citrate (BICITRA) solution 30 mL  30 mL Oral Once Sanaz Margob, DO        famotidine (PEPCID) 20 mg in sodium chloride (PF) 0.9 % 10 mL injection  20 mg IntraVENous Once [employer], DO        acetaminophen (TYLENOL) tablet 975 mg  975 mg Oral Once [employer], DO        oxytocin (PITOCIN) 30 units in 500 mL infusion  87.3 maryann-units/min IntraVENous Continuous PRN Sanaz Margob, DO        And    oxytocin (PITOCIN) 10 unit bolus from the bag  10 Units IntraVENous PRN Sanaz Margob, DO        ondansetron (ZOFRAN) injection 4 mg  4 mg IntraVENous Q6H PRN Sanaz Margob, DO        ceFAZolin (ANCEF) 2000 mg in sterile water 20 mL IV syringe  2,000 mg IntraVENous Once [employer], DO       

## 2023-04-28 NOTE — ANESTHESIA PROCEDURE NOTES
Spinal Block    Patient location during procedure: OB  End time: 4/28/2023 11:42 AM  Reason for block: primary anesthetic  Staffing  Performed: resident/CRNA   Anesthesiologist: Promise Chong MD  Resident/CRNA: BELLE Dye CRNA  Spinal Block  Patient position: sitting  Prep: Betadine and site prepped and draped  Patient monitoring: continuous pulse ox and frequent blood pressure checks  Location: L3/L4  Provider prep: mask and sterile gloves  Local infiltration: lidocaine  Needle  Needle type:  Anne   Needle gauge: 24 G  Needle length: 3.5 in  Assessment  Sensory level: T4  Swirl obtained: Yes  CSF: clear  Attempts: 1  Hemodynamics: stable  Preanesthetic Checklist  Completed: patient identified, IV checked, site marked, risks and benefits discussed, surgical/procedural consents, equipment checked, pre-op evaluation, timeout performed, anesthesia consent given, oxygen available, monitors applied/VS acknowledged, fire risk safety assessment completed and verbalized and blood product R/B/A discussed and consented

## 2023-04-29 LAB
HCT VFR BLD AUTO: 28.4 % (ref 36.3–47.1)
HGB BLD-MCNC: 9.5 G/DL (ref 11.9–15.1)
MCH RBC QN AUTO: 30.9 PG (ref 25.2–33.5)
MCHC RBC AUTO-ENTMCNC: 33.5 G/DL (ref 28.4–34.8)
MCV RBC AUTO: 92.5 FL (ref 82.6–102.9)
NRBC AUTOMATED: 0 PER 100 WBC
PDW BLD-RTO: 13.2 % (ref 11.8–14.4)
PLATELET # BLD AUTO: 214 K/UL (ref 138–453)
PMV BLD AUTO: 9.8 FL (ref 8.1–13.5)
RBC # BLD: 3.07 M/UL (ref 3.95–5.11)
WBC # BLD AUTO: 10.4 K/UL (ref 3.5–11.3)

## 2023-04-29 PROCEDURE — 1220000000 HC SEMI PRIVATE OB R&B

## 2023-04-29 PROCEDURE — 6370000000 HC RX 637 (ALT 250 FOR IP): Performed by: OBSTETRICS & GYNECOLOGY

## 2023-04-29 PROCEDURE — 36415 COLL VENOUS BLD VENIPUNCTURE: CPT

## 2023-04-29 PROCEDURE — 6370000000 HC RX 637 (ALT 250 FOR IP): Performed by: STUDENT IN AN ORGANIZED HEALTH CARE EDUCATION/TRAINING PROGRAM

## 2023-04-29 PROCEDURE — 85027 COMPLETE CBC AUTOMATED: CPT

## 2023-04-29 PROCEDURE — 6360000002 HC RX W HCPCS: Performed by: OBSTETRICS & GYNECOLOGY

## 2023-04-29 RX ORDER — LANOLIN ALCOHOL/MO/W.PET/CERES
325 CREAM (GRAM) TOPICAL
Qty: 90 TABLET | Refills: 3 | Status: SHIPPED | OUTPATIENT
Start: 2023-04-30

## 2023-04-29 RX ORDER — LANOLIN ALCOHOL/MO/W.PET/CERES
325 CREAM (GRAM) TOPICAL
Status: DISCONTINUED | OUTPATIENT
Start: 2023-04-30 | End: 2023-05-02 | Stop reason: HOSPADM

## 2023-04-29 RX ADMIN — IBUPROFEN 600 MG: 600 TABLET, FILM COATED ORAL at 16:35

## 2023-04-29 RX ADMIN — IBUPROFEN 600 MG: 600 TABLET, FILM COATED ORAL at 09:29

## 2023-04-29 RX ADMIN — OXYCODONE HYDROCHLORIDE 5 MG: 5 TABLET ORAL at 06:45

## 2023-04-29 RX ADMIN — ACETAMINOPHEN 1000 MG: 500 TABLET ORAL at 06:45

## 2023-04-29 RX ADMIN — DOCUSATE SODIUM 100 MG: 100 CAPSULE ORAL at 21:22

## 2023-04-29 RX ADMIN — OXYCODONE HYDROCHLORIDE 10 MG: 5 TABLET ORAL at 13:17

## 2023-04-29 RX ADMIN — ACETAMINOPHEN 1000 MG: 500 TABLET ORAL at 21:22

## 2023-04-29 RX ADMIN — Medication 1 TABLET: at 09:28

## 2023-04-29 RX ADMIN — OXYCODONE HYDROCHLORIDE 10 MG: 5 TABLET ORAL at 18:47

## 2023-04-29 RX ADMIN — OXYCODONE HYDROCHLORIDE 10 MG: 5 TABLET ORAL at 22:56

## 2023-04-29 RX ADMIN — DOCUSATE SODIUM 100 MG: 100 CAPSULE ORAL at 09:28

## 2023-04-29 RX ADMIN — KETOROLAC TROMETHAMINE 30 MG: 30 INJECTION, SOLUTION INTRAMUSCULAR; INTRAVENOUS at 02:14

## 2023-04-29 RX ADMIN — IBUPROFEN 600 MG: 600 TABLET, FILM COATED ORAL at 22:32

## 2023-04-29 RX ADMIN — ACETAMINOPHEN 1000 MG: 500 TABLET ORAL at 13:17

## 2023-04-29 ASSESSMENT — PAIN DESCRIPTION - LOCATION
LOCATION: ABDOMEN
LOCATION: INCISION
LOCATION: INCISION

## 2023-04-29 ASSESSMENT — PAIN SCALES - GENERAL
PAINLEVEL_OUTOF10: 3
PAINLEVEL_OUTOF10: 3
PAINLEVEL_OUTOF10: 5
PAINLEVEL_OUTOF10: 7
PAINLEVEL_OUTOF10: 4
PAINLEVEL_OUTOF10: 7
PAINLEVEL_OUTOF10: 7
PAINLEVEL_OUTOF10: 5

## 2023-04-29 ASSESSMENT — PAIN DESCRIPTION - DESCRIPTORS
DESCRIPTORS: CRAMPING;PRESSURE
DESCRIPTORS: ACHING;SORE
DESCRIPTORS: ACHING;CRAMPING
DESCRIPTORS: CRAMPING
DESCRIPTORS: CRAMPING;PRESSURE
DESCRIPTORS: PRESSURE

## 2023-04-29 ASSESSMENT — PAIN DESCRIPTION - ORIENTATION: ORIENTATION: LOWER

## 2023-04-29 ASSESSMENT — PAIN - FUNCTIONAL ASSESSMENT: PAIN_FUNCTIONAL_ASSESSMENT: ACTIVITIES ARE NOT PREVENTED

## 2023-04-29 NOTE — ANESTHESIA POSTPROCEDURE EVALUATION
Department of Anesthesiology  Postprocedure Note    Patient: Kyle Young  MRN: [id number]  YOB: 1990  Date of evaluation: 2023      Procedure Summary     Date: 23 Room / Location: [facility] OR [address] [place] [address]    Anesthesia Start: 9278 Anesthesia Stop: 5772    Procedure:  SECTION (Abdomen) Diagnosis:       Funic presentation, fetus 1      (24.1 wk IUP for primary c/s. Arm et cord presenting)    Surgeons: Shalom Pearson MD Responsible Provider: Ritchie Conn MD    Anesthesia Type: spinal ASA Status: 2 - Emergent          Anesthesia Type: No value filed.     Jill Phase I: Jill Score: 10    Jill Phase II: Jill Score: 10  POST-OP ANESTHESIA NOTE       BP (!) 102/58   Pulse 70   Temp 98.1 °F (36.7 °C) (Oral)   Resp 16   Ht 5' 4\" (1.626 m)   Wt 145 lb (65.8 kg)   LMP 11/10/2022   SpO2 99%   Breastfeeding Unknown   BMI 24.89 kg/m²    Pain Assessment: 0-10  Pain Level: 4       Anesthesia Post Evaluation    Patient location during evaluation: PACU  Patient participation: complete - patient participated  Level of consciousness: awake  Pain score: 4  Airway patency: patent  Nausea & Vomiting: no nausea and no vomiting  Complications: no  Cardiovascular status: hemodynamically stable  Respiratory status: acceptable  Hydration status: stable

## 2023-04-30 PROCEDURE — 6370000000 HC RX 637 (ALT 250 FOR IP): Performed by: STUDENT IN AN ORGANIZED HEALTH CARE EDUCATION/TRAINING PROGRAM

## 2023-04-30 PROCEDURE — 6370000000 HC RX 637 (ALT 250 FOR IP): Performed by: OBSTETRICS & GYNECOLOGY

## 2023-04-30 PROCEDURE — 1220000000 HC SEMI PRIVATE OB R&B

## 2023-04-30 RX ORDER — SIMETHICONE 80 MG
80 TABLET,CHEWABLE ORAL EVERY 6 HOURS PRN
Status: DISCONTINUED | OUTPATIENT
Start: 2023-04-30 | End: 2023-05-02 | Stop reason: HOSPADM

## 2023-04-30 RX ADMIN — FERROUS SULFATE TAB EC 325 MG (65 MG FE EQUIVALENT) 325 MG: 325 (65 FE) TABLET DELAYED RESPONSE at 08:39

## 2023-04-30 RX ADMIN — OXYCODONE HYDROCHLORIDE 10 MG: 5 TABLET ORAL at 03:30

## 2023-04-30 RX ADMIN — ACETAMINOPHEN 1000 MG: 500 TABLET ORAL at 03:30

## 2023-04-30 RX ADMIN — ACETAMINOPHEN 1000 MG: 500 TABLET ORAL at 09:39

## 2023-04-30 RX ADMIN — ACETAMINOPHEN 1000 MG: 500 TABLET ORAL at 20:09

## 2023-04-30 RX ADMIN — DOCUSATE SODIUM 100 MG: 100 CAPSULE ORAL at 20:09

## 2023-04-30 RX ADMIN — OXYCODONE HYDROCHLORIDE 10 MG: 5 TABLET ORAL at 13:31

## 2023-04-30 RX ADMIN — MAGNESIUM HYDROXIDE 30 ML: 400 SUSPENSION ORAL at 09:45

## 2023-04-30 RX ADMIN — OXYCODONE HYDROCHLORIDE 10 MG: 5 TABLET ORAL at 09:38

## 2023-04-30 RX ADMIN — OXYCODONE HYDROCHLORIDE 10 MG: 5 TABLET ORAL at 20:09

## 2023-04-30 RX ADMIN — DOCUSATE SODIUM 100 MG: 100 CAPSULE ORAL at 08:39

## 2023-04-30 RX ADMIN — Medication 1 TABLET: at 08:39

## 2023-04-30 RX ADMIN — IBUPROFEN 600 MG: 600 TABLET, FILM COATED ORAL at 13:30

## 2023-04-30 RX ADMIN — SIMETHICONE 80 MG: 80 TABLET, CHEWABLE ORAL at 10:13

## 2023-04-30 ASSESSMENT — PAIN DESCRIPTION - DESCRIPTORS
DESCRIPTORS: ACHING;CRAMPING;DISCOMFORT
DESCRIPTORS: ACHING;CRAMPING;DISCOMFORT
DESCRIPTORS: CRAMPING

## 2023-04-30 ASSESSMENT — PAIN DESCRIPTION - ORIENTATION: ORIENTATION: LOWER

## 2023-04-30 ASSESSMENT — PAIN DESCRIPTION - PAIN TYPE: TYPE: SURGICAL PAIN

## 2023-04-30 ASSESSMENT — PAIN SCALES - GENERAL
PAINLEVEL_OUTOF10: 7

## 2023-04-30 ASSESSMENT — PAIN DESCRIPTION - LOCATION
LOCATION: ABDOMEN

## 2023-04-30 ASSESSMENT — PAIN - FUNCTIONAL ASSESSMENT
PAIN_FUNCTIONAL_ASSESSMENT: ACTIVITIES ARE NOT PREVENTED
PAIN_FUNCTIONAL_ASSESSMENT: ACTIVITIES ARE NOT PREVENTED

## 2023-05-01 PROCEDURE — 6370000000 HC RX 637 (ALT 250 FOR IP): Performed by: STUDENT IN AN ORGANIZED HEALTH CARE EDUCATION/TRAINING PROGRAM

## 2023-05-01 PROCEDURE — 6370000000 HC RX 637 (ALT 250 FOR IP): Performed by: OBSTETRICS & GYNECOLOGY

## 2023-05-01 PROCEDURE — 1220000000 HC SEMI PRIVATE OB R&B

## 2023-05-01 RX ADMIN — IBUPROFEN 600 MG: 600 TABLET, FILM COATED ORAL at 22:18

## 2023-05-01 RX ADMIN — MAGNESIUM HYDROXIDE 30 ML: 400 SUSPENSION ORAL at 21:14

## 2023-05-01 RX ADMIN — OXYCODONE HYDROCHLORIDE 5 MG: 5 TABLET ORAL at 00:59

## 2023-05-01 RX ADMIN — IBUPROFEN 600 MG: 600 TABLET, FILM COATED ORAL at 00:58

## 2023-05-01 RX ADMIN — POLYETHYLENE GLYCOL 3350 17 G: 17 POWDER, FOR SOLUTION ORAL at 19:40

## 2023-05-01 RX ADMIN — IBUPROFEN 600 MG: 600 TABLET, FILM COATED ORAL at 08:25

## 2023-05-01 RX ADMIN — ACETAMINOPHEN 1000 MG: 500 TABLET ORAL at 19:40

## 2023-05-01 RX ADMIN — DOCUSATE SODIUM 100 MG: 100 CAPSULE ORAL at 08:25

## 2023-05-01 RX ADMIN — OXYCODONE HYDROCHLORIDE 10 MG: 5 TABLET ORAL at 12:49

## 2023-05-01 RX ADMIN — SIMETHICONE 80 MG: 80 TABLET, CHEWABLE ORAL at 19:40

## 2023-05-01 RX ADMIN — DOCUSATE SODIUM 100 MG: 100 CAPSULE ORAL at 21:13

## 2023-05-01 RX ADMIN — Medication 1 TABLET: at 08:25

## 2023-05-01 RX ADMIN — OXYCODONE HYDROCHLORIDE 10 MG: 5 TABLET ORAL at 21:14

## 2023-05-01 RX ADMIN — FERROUS SULFATE TAB EC 325 MG (65 MG FE EQUIVALENT) 325 MG: 325 (65 FE) TABLET DELAYED RESPONSE at 08:25

## 2023-05-01 RX ADMIN — IBUPROFEN 600 MG: 600 TABLET, FILM COATED ORAL at 16:56

## 2023-05-01 RX ADMIN — Medication 3 MG: at 22:18

## 2023-05-01 RX ADMIN — OXYCODONE HYDROCHLORIDE 10 MG: 5 TABLET ORAL at 16:56

## 2023-05-01 RX ADMIN — ACETAMINOPHEN 1000 MG: 500 TABLET ORAL at 12:48

## 2023-05-01 ASSESSMENT — PAIN SCALES - GENERAL
PAINLEVEL_OUTOF10: 7
PAINLEVEL_OUTOF10: 4
PAINLEVEL_OUTOF10: 5
PAINLEVEL_OUTOF10: 3

## 2023-05-01 ASSESSMENT — PAIN DESCRIPTION - LOCATION
LOCATION: ABDOMEN
LOCATION: ABDOMEN;INCISION

## 2023-05-01 ASSESSMENT — PAIN DESCRIPTION - PAIN TYPE: TYPE: SURGICAL PAIN

## 2023-05-01 ASSESSMENT — PAIN - FUNCTIONAL ASSESSMENT: PAIN_FUNCTIONAL_ASSESSMENT: ACTIVITIES ARE NOT PREVENTED

## 2023-05-01 ASSESSMENT — PAIN DESCRIPTION - DESCRIPTORS
DESCRIPTORS: ACHING;CRAMPING;DISCOMFORT
DESCRIPTORS: ACHING;CRAMPING;DISCOMFORT
DESCRIPTORS: DISCOMFORT;CRAMPING

## 2023-05-01 NOTE — LACTATION NOTE
Mom with complaints of nipple soreness, currently using 24mm flanges when pumping. Nipples are reddened, but no cracks or bleeding noted. Increased to 27mm flanges and obtained 15ml's. Mom verbalizes increased comfort and excited with increase in milk volume.
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
## 2023-05-01 NOTE — CARE COORDINATION
Social Work    Sw familiar with mom from hospital stay before delivery. Mom delivered last week, baby is GA 24w4d. Sw reviewed medical record (current active problem list) and tox screens and found no current concerns. Sw spoke with mom briefly to check in how she is doing after delivery and inquire if any needs or concerns. Mom reports that she is feeling pretty good and was happy as she discussed how her daughter is doing in the NICU. Mom denied any current s/s of anxiety or depression and is aware to reach out to OB if any s/s occur after dc. Mom also informed to keep in contact with SW if any s/s arise and discussed increased probability due to nicu stay. Sw provided mom with Sw's contact number. Mom reports a really good support system (fob, godmother, her mom, friends) and denied any current questions or needs. Mom reports this is her 7th child (ages 21 months up to 16years old). Mom states ped will be in [place] where her other kids go. Mom reports she and Fob have bought a home in [place] that dad is fixing up, but mom currently remains to reside with her mom and 6 kids in [facility]. Mom is linked with WIC and JFS. Sw discussed [employer] SSI and provided details on how mom can apply. Sw encouraged mom to reach out if any issues or concerns arise.

## 2023-05-02 VITALS
DIASTOLIC BLOOD PRESSURE: 74 MMHG | RESPIRATION RATE: 16 BRPM | OXYGEN SATURATION: 97 % | SYSTOLIC BLOOD PRESSURE: 103 MMHG | WEIGHT: 145 LBS | HEIGHT: 64 IN | HEART RATE: 70 BPM | BODY MASS INDEX: 24.75 KG/M2 | TEMPERATURE: 97.9 F

## 2023-05-02 PROBLEM — Z3A.23 23 WEEKS GESTATION OF PREGNANCY: Status: RESOLVED | Noted: 2023-04-24 | Resolved: 2023-05-02

## 2023-05-02 LAB — SURGICAL PATHOLOGY REPORT: NORMAL

## 2023-05-02 PROCEDURE — 6370000000 HC RX 637 (ALT 250 FOR IP): Performed by: STUDENT IN AN ORGANIZED HEALTH CARE EDUCATION/TRAINING PROGRAM

## 2023-05-02 PROCEDURE — 6370000000 HC RX 637 (ALT 250 FOR IP): Performed by: OBSTETRICS & GYNECOLOGY

## 2023-05-02 RX ADMIN — ACETAMINOPHEN 1000 MG: 500 TABLET ORAL at 01:28

## 2023-05-02 RX ADMIN — IBUPROFEN 600 MG: 600 TABLET, FILM COATED ORAL at 05:20

## 2023-05-02 RX ADMIN — POLYETHYLENE GLYCOL 3350 17 G: 17 POWDER, FOR SOLUTION ORAL at 08:29

## 2023-05-02 RX ADMIN — Medication 1 TABLET: at 08:29

## 2023-05-02 RX ADMIN — ACETAMINOPHEN 1000 MG: 500 TABLET ORAL at 08:29

## 2023-05-02 RX ADMIN — OXYCODONE HYDROCHLORIDE 5 MG: 5 TABLET ORAL at 05:20

## 2023-05-02 RX ADMIN — OXYCODONE HYDROCHLORIDE 10 MG: 5 TABLET ORAL at 01:28

## 2023-05-02 RX ADMIN — DOCUSATE SODIUM 100 MG: 100 CAPSULE ORAL at 08:29

## 2023-05-02 RX ADMIN — FERROUS SULFATE TAB EC 325 MG (65 MG FE EQUIVALENT) 325 MG: 325 (65 FE) TABLET DELAYED RESPONSE at 08:29

## 2023-05-02 ASSESSMENT — PAIN SCALES - GENERAL
PAINLEVEL_OUTOF10: 4
PAINLEVEL_OUTOF10: 7
PAINLEVEL_OUTOF10: 3

## 2023-05-02 ASSESSMENT — PAIN DESCRIPTION - DESCRIPTORS: DESCRIPTORS: DISCOMFORT

## 2023-05-02 ASSESSMENT — PAIN DESCRIPTION - ORIENTATION: ORIENTATION: MID

## 2023-05-02 ASSESSMENT — PAIN DESCRIPTION - LOCATION
LOCATION: ABDOMEN;INCISION
LOCATION: ABDOMEN
LOCATION: ABDOMEN;INCISION

## 2023-05-02 NOTE — FLOWSHEET NOTE
Pt discharged to private residence with personal belongings. Per residents, pt can remove silver dressing on day 7, 5/5/23 and call OB if any concerns. Pt instructed and verbalized understanding. Pt transported via wheelchair with mother.

## 2023-05-02 NOTE — PROGRESS NOTES
CLINICAL PHARMACY NOTE: MEDS TO BEDS    Total # of Prescriptions Filled: 7   The following medications were delivered to the patient:  Mylicon  Ibuprofen  Oxycodone  Senna plus  Acetaminohen  Ondansetron  iron    Additional Documentation:

## 2023-05-02 NOTE — PLAN OF CARE
Problem: ABCDS Injury Assessment  Goal: Absence of physical injury  Outcome: Progressing     Problem: Pain  Goal: Verbalizes/displays adequate comfort level or baseline comfort level  Outcome: Progressing     Problem: Infection - Adult  Goal: Absence of infection during hospitalization  Outcome: Progressing     Problem: Postpartum  Goal: Experiences normal postpartum course  Description:  Postpartum OB-Pregnancy care plan goal which identifies if the mother is experiencing a normal postpartum course  Outcome: Progressing  Goal: Appropriate maternal -  bonding  Description:  Postpartum OB-Pregnancy care plan goal which identifies if the mother and  are bonding appropriately  Outcome: Progressing  Goal: Establishment of infant feeding pattern  Description:  Postpartum OB-Pregnancy care plan goal which identifies if the mother is establishing a feeding pattern with their   Outcome: Progressing  Goal: Incisions, wounds, or drain sites healing without S/S of infection  Outcome: Progressing

## 2023-05-02 NOTE — PLAN OF CARE
Problem: ABCDS Injury Assessment  Goal: Absence of physical injury  Outcome: Completed     Problem: Pain  Goal: Verbalizes/displays adequate comfort level or baseline comfort level  Outcome: Completed     Problem: Infection - Adult  Goal: Absence of infection during hospitalization  Outcome: Completed     Problem: Postpartum  Goal: Experiences normal postpartum course  Description:  Postpartum OB-Pregnancy care plan goal which identifies if the mother is experiencing a normal postpartum course  Outcome: Completed  Goal: Appropriate maternal -  bonding  Description:  Postpartum OB-Pregnancy care plan goal which identifies if the mother and  are bonding appropriately  Outcome: Completed  Goal: Establishment of infant feeding pattern  Description:  Postpartum OB-Pregnancy care plan goal which identifies if the mother is establishing a feeding pattern with their   Outcome: Completed  Goal: Incisions, wounds, or drain sites healing without S/S of infection  Outcome: Completed
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
## 2023-05-02 NOTE — LACTATION NOTE
Lactation round made. Mother reports pumping more each time. Denies questions or assistance at this time. Mother has a pump at home.

## 2023-05-04 NOTE — PROGRESS NOTES
CLINICAL PHARMACY NOTE: MEDS TO BEDS    Total # of Prescriptions Filled: 7   The following medications were delivered to the patient:  Gas relief 80 mg chew  Ibprofen 600 mg tab  Oxycodone 5 mg tab  Senna- boone 8.6 mg tqab  Acetaminophen  mg tab  Ondansetron 4 mg tab  Ferosul 325 mg tab    Additional Documentation: Malcom Farias delivered

## 2024-10-10 ENCOUNTER — ANESTHESIA (OUTPATIENT)
Dept: LABOR AND DELIVERY | Age: 34
End: 2024-10-10

## 2024-10-10 ENCOUNTER — HOSPITAL ENCOUNTER (INPATIENT)
Age: 34
LOS: 3 days | Discharge: HOME | End: 2024-10-13
Attending: STUDENT IN AN ORGANIZED HEALTH CARE EDUCATION/TRAINING PROGRAM | Admitting: STUDENT IN AN ORGANIZED HEALTH CARE EDUCATION/TRAINING PROGRAM

## 2024-10-10 ENCOUNTER — ANESTHESIA EVENT (OUTPATIENT)
Dept: LABOR AND DELIVERY | Age: 34
End: 2024-10-10

## 2024-10-10 DIAGNOSIS — Z98.890 POSTOPERATIVE STATE: Primary | ICD-10-CM

## 2024-10-10 PROBLEM — O09.90 HIGH-RISK PREGNANCY SUPERVISION, UNSPECIFIED TRIMESTER: Status: ACTIVE | Noted: 2024-10-10

## 2024-10-10 PROBLEM — Z98.891 HISTORY OF CLASSICAL CESAREAN SECTION: Status: ACTIVE | Noted: 2024-10-10

## 2024-10-10 PROBLEM — Z30.430 ENCOUNTER FOR IUD INSERTION: Status: ACTIVE | Noted: 2024-10-10

## 2024-10-10 PROBLEM — Z3A.36 36 WEEKS GESTATION OF PREGNANCY: Status: ACTIVE | Noted: 2024-10-10

## 2024-10-10 LAB
ABO + RH BLD: NORMAL
AMPHET UR QL SCN: NEGATIVE
ARM BAND NUMBER: NORMAL
BARBITURATES UR QL SCN: NEGATIVE
BENZODIAZ UR QL: NEGATIVE
BLOOD BANK SAMPLE EXPIRATION: NORMAL
BLOOD GROUP ANTIBODIES SERPL: NEGATIVE
CANNABINOIDS UR QL SCN: POSITIVE
COCAINE UR QL SCN: NEGATIVE
ERYTHROCYTE [DISTWIDTH] IN BLOOD BY AUTOMATED COUNT: 14.1 % (ref 11.8–14.4)
FENTANYL UR QL: NEGATIVE
HBV SURFACE AB SERPL IA-ACNC: <3.5 MIU/ML
HBV SURFACE AG SERPL QL IA: NONREACTIVE
HCT VFR BLD AUTO: 29.5 % (ref 36.3–47.1)
HCV AB SERPL QL IA: NONREACTIVE
HGB BLD-MCNC: 9.8 G/DL (ref 11.9–15.1)
HIV 1+2 AB+HIV1 P24 AG SERPL QL IA: NONREACTIVE
MCH RBC QN AUTO: 30 PG (ref 25.2–33.5)
MCHC RBC AUTO-ENTMCNC: 33.2 G/DL (ref 28.4–34.8)
MCV RBC AUTO: 90.2 FL (ref 82.6–102.9)
METHADONE UR QL: NEGATIVE
NRBC BLD-RTO: 0 PER 100 WBC
OPIATES UR QL SCN: NEGATIVE
OXYCODONE UR QL SCN: NEGATIVE
PCP UR QL SCN: NEGATIVE
PLATELET # BLD AUTO: 249 K/UL (ref 138–453)
PMV BLD AUTO: 10.8 FL (ref 8.1–13.5)
RBC # BLD AUTO: 3.27 M/UL (ref 3.95–5.11)
RUBV IGG SERPL QL IA: 236 IU/ML
T PALLIDUM AB SER QL IA: NONREACTIVE
TEST INFORMATION: ABNORMAL
WBC OTHER # BLD: 10.6 K/UL (ref 3.5–11.3)

## 2024-10-10 PROCEDURE — 6360000002 HC RX W HCPCS

## 2024-10-10 PROCEDURE — 3700000001 HC ADD 15 MINUTES (ANESTHESIA): Performed by: STUDENT IN AN ORGANIZED HEALTH CARE EDUCATION/TRAINING PROGRAM

## 2024-10-10 PROCEDURE — 6370000000 HC RX 637 (ALT 250 FOR IP)

## 2024-10-10 PROCEDURE — 3609079900 HC CESAREAN SECTION: Performed by: STUDENT IN AN ORGANIZED HEALTH CARE EDUCATION/TRAINING PROGRAM

## 2024-10-10 PROCEDURE — 85027 COMPLETE CBC AUTOMATED: CPT

## 2024-10-10 PROCEDURE — 7100000001 HC PACU RECOVERY - ADDTL 15 MIN: Performed by: STUDENT IN AN ORGANIZED HEALTH CARE EDUCATION/TRAINING PROGRAM

## 2024-10-10 PROCEDURE — 7100000000 HC PACU RECOVERY - FIRST 15 MIN: Performed by: STUDENT IN AN ORGANIZED HEALTH CARE EDUCATION/TRAINING PROGRAM

## 2024-10-10 PROCEDURE — 87389 HIV-1 AG W/HIV-1&-2 AB AG IA: CPT

## 2024-10-10 PROCEDURE — 86780 TREPONEMA PALLIDUM: CPT

## 2024-10-10 PROCEDURE — 59514 CESAREAN DELIVERY ONLY: CPT | Performed by: STUDENT IN AN ORGANIZED HEALTH CARE EDUCATION/TRAINING PROGRAM

## 2024-10-10 PROCEDURE — 1220000000 HC SEMI PRIVATE OB R&B

## 2024-10-10 PROCEDURE — 2580000003 HC RX 258

## 2024-10-10 PROCEDURE — 86803 HEPATITIS C AB TEST: CPT

## 2024-10-10 PROCEDURE — 2500000003 HC RX 250 WO HCPCS

## 2024-10-10 PROCEDURE — 86850 RBC ANTIBODY SCREEN: CPT

## 2024-10-10 PROCEDURE — 3700000000 HC ANESTHESIA ATTENDED CARE: Performed by: STUDENT IN AN ORGANIZED HEALTH CARE EDUCATION/TRAINING PROGRAM

## 2024-10-10 PROCEDURE — 86900 BLOOD TYPING SEROLOGIC ABO: CPT

## 2024-10-10 PROCEDURE — 80307 DRUG TEST PRSMV CHEM ANLYZR: CPT

## 2024-10-10 PROCEDURE — 87340 HEPATITIS B SURFACE AG IA: CPT

## 2024-10-10 PROCEDURE — 6360000002 HC RX W HCPCS: Performed by: NURSE ANESTHETIST, CERTIFIED REGISTERED

## 2024-10-10 PROCEDURE — 87591 N.GONORRHOEAE DNA AMP PROB: CPT

## 2024-10-10 PROCEDURE — 2500000003 HC RX 250 WO HCPCS: Performed by: NURSE ANESTHETIST, CERTIFIED REGISTERED

## 2024-10-10 PROCEDURE — 86901 BLOOD TYPING SEROLOGIC RH(D): CPT

## 2024-10-10 PROCEDURE — 86762 RUBELLA ANTIBODY: CPT

## 2024-10-10 PROCEDURE — 87491 CHLMYD TRACH DNA AMP PROBE: CPT

## 2024-10-10 PROCEDURE — 86317 IMMUNOASSAY INFECTIOUS AGENT: CPT

## 2024-10-10 PROCEDURE — 88307 TISSUE EXAM BY PATHOLOGIST: CPT

## 2024-10-10 PROCEDURE — 2580000003 HC RX 258: Performed by: NURSE ANESTHETIST, CERTIFIED REGISTERED

## 2024-10-10 PROCEDURE — 6360000002 HC RX W HCPCS: Performed by: ANESTHESIOLOGY

## 2024-10-10 PROCEDURE — 0UH90HZ INSERTION OF CONTRACEPTIVE DEVICE INTO UTERUS, OPEN APPROACH: ICD-10-PCS | Performed by: STUDENT IN AN ORGANIZED HEALTH CARE EDUCATION/TRAINING PROGRAM

## 2024-10-10 RX ORDER — ACETAMINOPHEN 500 MG
1000 TABLET ORAL EVERY 6 HOURS PRN
Qty: 60 TABLET | Refills: 1 | Status: SHIPPED | OUTPATIENT
Start: 2024-10-10 | End: 2024-10-13

## 2024-10-10 RX ORDER — IBUPROFEN 600 MG/1
600 TABLET, FILM COATED ORAL EVERY 6 HOURS PRN
Status: DISCONTINUED | OUTPATIENT
Start: 2024-10-11 | End: 2024-10-13 | Stop reason: HOSPADM

## 2024-10-10 RX ORDER — SODIUM CHLORIDE, SODIUM LACTATE, POTASSIUM CHLORIDE, CALCIUM CHLORIDE 600; 310; 30; 20 MG/100ML; MG/100ML; MG/100ML; MG/100ML
INJECTION, SOLUTION INTRAVENOUS CONTINUOUS
Status: DISCONTINUED | OUTPATIENT
Start: 2024-10-10 | End: 2024-10-10

## 2024-10-10 RX ORDER — SCOLOPAMINE TRANSDERMAL SYSTEM 1 MG/1
1 PATCH, EXTENDED RELEASE TRANSDERMAL
Status: DISCONTINUED | OUTPATIENT
Start: 2024-10-10 | End: 2024-10-10

## 2024-10-10 RX ORDER — ACETAMINOPHEN 500 MG
1000 TABLET ORAL EVERY 6 HOURS
Status: DISCONTINUED | OUTPATIENT
Start: 2024-10-10 | End: 2024-10-13 | Stop reason: HOSPADM

## 2024-10-10 RX ORDER — OXYCODONE HYDROCHLORIDE 5 MG/1
5 TABLET ORAL EVERY 6 HOURS PRN
Qty: 20 TABLET | Refills: 0 | Status: SHIPPED | OUTPATIENT
Start: 2024-10-10 | End: 2024-10-11

## 2024-10-10 RX ORDER — AZITHROMYCIN 500 MG/1
INJECTION, POWDER, LYOPHILIZED, FOR SOLUTION INTRAVENOUS
Status: DISCONTINUED | OUTPATIENT
Start: 2024-10-10 | End: 2024-10-10 | Stop reason: SDUPTHER

## 2024-10-10 RX ORDER — ONDANSETRON 4 MG/1
4 TABLET, FILM COATED ORAL 3 TIMES DAILY PRN
Qty: 15 TABLET | Refills: 0 | Status: SHIPPED | OUTPATIENT
Start: 2024-10-10 | End: 2024-10-13

## 2024-10-10 RX ORDER — CITRIC ACID/SODIUM CITRATE 334-500MG
30 SOLUTION, ORAL ORAL ONCE
Status: COMPLETED | OUTPATIENT
Start: 2024-10-10 | End: 2024-10-10

## 2024-10-10 RX ORDER — SODIUM CHLORIDE 9 MG/ML
INJECTION, SOLUTION INTRAVENOUS PRN
Status: DISCONTINUED | OUTPATIENT
Start: 2024-10-10 | End: 2024-10-10

## 2024-10-10 RX ORDER — ONDANSETRON 2 MG/ML
4 INJECTION INTRAMUSCULAR; INTRAVENOUS EVERY 6 HOURS PRN
Status: DISCONTINUED | OUTPATIENT
Start: 2024-10-10 | End: 2024-10-10

## 2024-10-10 RX ORDER — BUPIVACAINE HYDROCHLORIDE 7.5 MG/ML
INJECTION, SOLUTION INTRASPINAL
Status: COMPLETED | OUTPATIENT
Start: 2024-10-10 | End: 2024-10-10

## 2024-10-10 RX ORDER — OXYCODONE HYDROCHLORIDE 5 MG/1
10 TABLET ORAL EVERY 4 HOURS PRN
Status: DISCONTINUED | OUTPATIENT
Start: 2024-10-10 | End: 2024-10-13 | Stop reason: HOSPADM

## 2024-10-10 RX ORDER — IBUPROFEN 600 MG/1
600 TABLET, FILM COATED ORAL EVERY 6 HOURS PRN
Qty: 40 TABLET | Refills: 1 | Status: SHIPPED | OUTPATIENT
Start: 2024-10-10 | End: 2024-10-13

## 2024-10-10 RX ORDER — KETOROLAC TROMETHAMINE 30 MG/ML
INJECTION, SOLUTION INTRAMUSCULAR; INTRAVENOUS
Status: DISCONTINUED | OUTPATIENT
Start: 2024-10-10 | End: 2024-10-10 | Stop reason: SDUPTHER

## 2024-10-10 RX ORDER — TRANEXAMIC ACID 10 MG/ML
1000 INJECTION, SOLUTION INTRAVENOUS ONCE
Status: COMPLETED | OUTPATIENT
Start: 2024-10-10 | End: 2024-10-10

## 2024-10-10 RX ORDER — ACETAMINOPHEN 325 MG/1
975 TABLET ORAL ONCE
Status: COMPLETED | OUTPATIENT
Start: 2024-10-10 | End: 2024-10-10

## 2024-10-10 RX ORDER — VITAMIN A, ASCORBIC ACID, CHOLECALCIFEROL, .ALPHA.-TOCOPHEROL ACETATE, DL-, THIAMINE MONONITRATE, RIBOFLAVIN, NIACINAMIDE, PYRIDOXINE HYDROCHLORIDE, FOLIC ACID, CYANOCOBALAMIN, CALCIUM CARBONATE, IRON, ZINC OXIDE, AND CUPRIC OXIDE 4000; 120; 400; 22; 1.84; 3; 20; 10; 1; 12; 200; 29; 25; 2 [IU]/1; MG/1; [IU]/1; [IU]/1; MG/1; MG/1; MG/1; MG/1; MG/1; UG/1; MG/1; MG/1; MG/1; MG/1
1 TABLET ORAL DAILY
Status: DISCONTINUED | OUTPATIENT
Start: 2024-10-10 | End: 2024-10-13 | Stop reason: HOSPADM

## 2024-10-10 RX ORDER — SODIUM CHLORIDE 9 MG/ML
INJECTION, SOLUTION INTRAVENOUS PRN
Status: DISCONTINUED | OUTPATIENT
Start: 2024-10-10 | End: 2024-10-13 | Stop reason: HOSPADM

## 2024-10-10 RX ORDER — SENNA AND DOCUSATE SODIUM 50; 8.6 MG/1; MG/1
1 TABLET, FILM COATED ORAL 2 TIMES DAILY
Qty: 60 TABLET | Refills: 0 | Status: SHIPPED | OUTPATIENT
Start: 2024-10-10 | End: 2024-10-13

## 2024-10-10 RX ORDER — ONDANSETRON 4 MG/1
4 TABLET, ORALLY DISINTEGRATING ORAL EVERY 8 HOURS PRN
Status: DISCONTINUED | OUTPATIENT
Start: 2024-10-10 | End: 2024-10-13 | Stop reason: HOSPADM

## 2024-10-10 RX ORDER — SIMETHICONE 80 MG
80 TABLET,CHEWABLE ORAL EVERY 6 HOURS PRN
Status: DISCONTINUED | OUTPATIENT
Start: 2024-10-10 | End: 2024-10-13 | Stop reason: HOSPADM

## 2024-10-10 RX ORDER — MORPHINE SULFATE 1 MG/ML
INJECTION, SOLUTION EPIDURAL; INTRATHECAL; INTRAVENOUS
Status: COMPLETED | OUTPATIENT
Start: 2024-10-10 | End: 2024-10-10

## 2024-10-10 RX ORDER — SODIUM CHLORIDE, SODIUM LACTATE, POTASSIUM CHLORIDE, AND CALCIUM CHLORIDE .6; .31; .03; .02 G/100ML; G/100ML; G/100ML; G/100ML
1000 INJECTION, SOLUTION INTRAVENOUS ONCE
Status: DISCONTINUED | OUTPATIENT
Start: 2024-10-10 | End: 2024-10-10

## 2024-10-10 RX ORDER — SODIUM CHLORIDE 0.9 % (FLUSH) 0.9 %
10 SYRINGE (ML) INJECTION PRN
Status: DISCONTINUED | OUTPATIENT
Start: 2024-10-10 | End: 2024-10-10

## 2024-10-10 RX ORDER — OXYCODONE HYDROCHLORIDE 5 MG/1
5 TABLET ORAL EVERY 4 HOURS PRN
Status: DISCONTINUED | OUTPATIENT
Start: 2024-10-10 | End: 2024-10-13 | Stop reason: HOSPADM

## 2024-10-10 RX ORDER — ONDANSETRON 2 MG/ML
4 INJECTION INTRAMUSCULAR; INTRAVENOUS EVERY 6 HOURS PRN
Status: DISCONTINUED | OUTPATIENT
Start: 2024-10-10 | End: 2024-10-13 | Stop reason: HOSPADM

## 2024-10-10 RX ORDER — SODIUM CHLORIDE 0.9 % (FLUSH) 0.9 %
5-40 SYRINGE (ML) INJECTION EVERY 12 HOURS SCHEDULED
Status: DISCONTINUED | OUTPATIENT
Start: 2024-10-10 | End: 2024-10-13 | Stop reason: HOSPADM

## 2024-10-10 RX ORDER — KETOROLAC TROMETHAMINE 30 MG/ML
30 INJECTION, SOLUTION INTRAMUSCULAR; INTRAVENOUS EVERY 6 HOURS
Status: COMPLETED | OUTPATIENT
Start: 2024-10-10 | End: 2024-10-11

## 2024-10-10 RX ORDER — ACETAMINOPHEN 500 MG
1000 TABLET ORAL EVERY 6 HOURS PRN
Status: DISCONTINUED | OUTPATIENT
Start: 2024-10-10 | End: 2024-10-10

## 2024-10-10 RX ORDER — ONDANSETRON 4 MG/1
4 TABLET, ORALLY DISINTEGRATING ORAL EVERY 8 HOURS PRN
Status: DISCONTINUED | OUTPATIENT
Start: 2024-10-10 | End: 2024-10-10

## 2024-10-10 RX ORDER — DIPHENHYDRAMINE HCL 25 MG
25 TABLET ORAL EVERY 6 HOURS PRN
Status: DISCONTINUED | OUTPATIENT
Start: 2024-10-10 | End: 2024-10-10

## 2024-10-10 RX ORDER — POLYETHYLENE GLYCOL 3350 17 G/17G
17 POWDER, FOR SOLUTION ORAL DAILY
Status: DISCONTINUED | OUTPATIENT
Start: 2024-10-10 | End: 2024-10-12

## 2024-10-10 RX ORDER — TRANEXAMIC ACID 10 MG/ML
INJECTION, SOLUTION INTRAVENOUS
Status: DISCONTINUED | OUTPATIENT
Start: 2024-10-10 | End: 2024-10-10 | Stop reason: SDUPTHER

## 2024-10-10 RX ORDER — SENNA AND DOCUSATE SODIUM 50; 8.6 MG/1; MG/1
2 TABLET, FILM COATED ORAL 2 TIMES DAILY
Status: DISCONTINUED | OUTPATIENT
Start: 2024-10-10 | End: 2024-10-12

## 2024-10-10 RX ORDER — SODIUM CHLORIDE 0.9 % (FLUSH) 0.9 %
5-40 SYRINGE (ML) INJECTION PRN
Status: DISCONTINUED | OUTPATIENT
Start: 2024-10-10 | End: 2024-10-13 | Stop reason: HOSPADM

## 2024-10-10 RX ORDER — DIPHENHYDRAMINE HYDROCHLORIDE 50 MG/ML
25 INJECTION INTRAMUSCULAR; INTRAVENOUS EVERY 6 HOURS PRN
Status: DISCONTINUED | OUTPATIENT
Start: 2024-10-10 | End: 2024-10-13 | Stop reason: HOSPADM

## 2024-10-10 RX ORDER — SIMETHICONE 80 MG
80 TABLET,CHEWABLE ORAL 4 TIMES DAILY PRN
Qty: 90 TABLET | Refills: 1 | Status: SHIPPED | OUTPATIENT
Start: 2024-10-10 | End: 2024-10-11

## 2024-10-10 RX ORDER — ONDANSETRON 2 MG/ML
INJECTION INTRAMUSCULAR; INTRAVENOUS
Status: DISCONTINUED | OUTPATIENT
Start: 2024-10-10 | End: 2024-10-10 | Stop reason: SDUPTHER

## 2024-10-10 RX ADMIN — SENNOSIDES AND DOCUSATE SODIUM 2 TABLET: 50; 8.6 TABLET ORAL at 19:46

## 2024-10-10 RX ADMIN — ACETAMINOPHEN 1000 MG: 500 TABLET ORAL at 11:59

## 2024-10-10 RX ADMIN — SODIUM CHLORIDE, POTASSIUM CHLORIDE, SODIUM LACTATE AND CALCIUM CHLORIDE: 600; 310; 30; 20 INJECTION, SOLUTION INTRAVENOUS at 07:40

## 2024-10-10 RX ADMIN — OXYCODONE 10 MG: 5 TABLET ORAL at 16:04

## 2024-10-10 RX ADMIN — Medication 87.3 MILLI-UNITS/MIN: at 07:43

## 2024-10-10 RX ADMIN — SODIUM CHLORIDE, POTASSIUM CHLORIDE, SODIUM LACTATE AND CALCIUM CHLORIDE: 600; 310; 30; 20 INJECTION, SOLUTION INTRAVENOUS at 07:45

## 2024-10-10 RX ADMIN — Medication 2000 MG: at 05:08

## 2024-10-10 RX ADMIN — AZITHROMYCIN MONOHYDRATE 500 MG: 500 INJECTION, POWDER, LYOPHILIZED, FOR SOLUTION INTRAVENOUS at 06:10

## 2024-10-10 RX ADMIN — KETOROLAC TROMETHAMINE 30 MG: 30 INJECTION, SOLUTION INTRAMUSCULAR; INTRAVENOUS at 07:10

## 2024-10-10 RX ADMIN — OXYCODONE 10 MG: 5 TABLET ORAL at 19:58

## 2024-10-10 RX ADMIN — TRANEXAMIC ACID 1 G: 10 INJECTION, SOLUTION INTRAVENOUS at 06:10

## 2024-10-10 RX ADMIN — SODIUM CHLORIDE, POTASSIUM CHLORIDE, SODIUM LACTATE AND CALCIUM CHLORIDE: 600; 310; 30; 20 INJECTION, SOLUTION INTRAVENOUS at 05:58

## 2024-10-10 RX ADMIN — OXYCODONE 10 MG: 5 TABLET ORAL at 10:58

## 2024-10-10 RX ADMIN — ACETAMINOPHEN 1000 MG: 500 TABLET ORAL at 18:04

## 2024-10-10 RX ADMIN — MORPHINE SULFATE 0.2 MG: 1 INJECTION, SOLUTION EPIDURAL; INTRATHECAL; INTRAVENOUS at 06:02

## 2024-10-10 RX ADMIN — SODIUM CHLORIDE, POTASSIUM CHLORIDE, SODIUM LACTATE AND CALCIUM CHLORIDE: 600; 310; 30; 20 INJECTION, SOLUTION INTRAVENOUS at 07:13

## 2024-10-10 RX ADMIN — DIPHENHYDRAMINE HYDROCHLORIDE 25 MG: 50 INJECTION INTRAMUSCULAR; INTRAVENOUS at 10:58

## 2024-10-10 RX ADMIN — Medication 909 ML/HR: at 06:29

## 2024-10-10 RX ADMIN — KETOROLAC TROMETHAMINE 30 MG: 30 INJECTION, SOLUTION INTRAMUSCULAR; INTRAVENOUS at 13:04

## 2024-10-10 RX ADMIN — LEVONORGESTREL 1 EACH: 52 INTRAUTERINE DEVICE INTRAUTERINE at 06:30

## 2024-10-10 RX ADMIN — FAMOTIDINE 20 MG: 10 INJECTION, SOLUTION INTRAVENOUS at 05:08

## 2024-10-10 RX ADMIN — PHENYLEPHRINE HYDROCHLORIDE 50 MCG/MIN: 10 INJECTION INTRAVENOUS at 06:06

## 2024-10-10 RX ADMIN — TRANEXAMIC ACID 1000 MG: 10 INJECTION, SOLUTION INTRAVENOUS at 05:09

## 2024-10-10 RX ADMIN — SODIUM CITRATE AND CITRIC ACID MONOHYDRATE 30 ML: 500; 334 SOLUTION ORAL at 05:07

## 2024-10-10 RX ADMIN — ONDANSETRON 4 MG: 2 INJECTION, SOLUTION INTRAMUSCULAR; INTRAVENOUS at 05:08

## 2024-10-10 RX ADMIN — ONDANSETRON 4 MG: 2 INJECTION INTRAMUSCULAR; INTRAVENOUS at 06:44

## 2024-10-10 RX ADMIN — KETOROLAC TROMETHAMINE 30 MG: 30 INJECTION, SOLUTION INTRAMUSCULAR; INTRAVENOUS at 19:46

## 2024-10-10 RX ADMIN — BUPIVACAINE HYDROCHLORIDE IN DEXTROSE 12.5 MG: 7.5 INJECTION, SOLUTION SUBARACHNOID at 06:02

## 2024-10-10 RX ADMIN — SODIUM CHLORIDE, PRESERVATIVE FREE 10 ML: 5 INJECTION INTRAVENOUS at 19:47

## 2024-10-10 RX ADMIN — SODIUM CHLORIDE, SODIUM LACTATE, POTASSIUM CHLORIDE, AND CALCIUM CHLORIDE 1000 ML: .6; .31; .03; .02 INJECTION, SOLUTION INTRAVENOUS at 05:00

## 2024-10-10 RX ADMIN — ACETAMINOPHEN 975 MG: 325 TABLET ORAL at 05:07

## 2024-10-10 ASSESSMENT — PAIN DESCRIPTION - LOCATION
LOCATION: ABDOMEN
LOCATION: ABDOMEN;INCISION
LOCATION: ABDOMEN
LOCATION: ABDOMEN

## 2024-10-10 ASSESSMENT — PAIN - FUNCTIONAL ASSESSMENT: PAIN_FUNCTIONAL_ASSESSMENT: ACTIVITIES ARE NOT PREVENTED

## 2024-10-10 ASSESSMENT — PAIN SCALES - GENERAL
PAINLEVEL_OUTOF10: 7
PAINLEVEL_OUTOF10: 4
PAINLEVEL_OUTOF10: 1
PAINLEVEL_OUTOF10: 9
PAINLEVEL_OUTOF10: 6

## 2024-10-10 ASSESSMENT — PAIN DESCRIPTION - DESCRIPTORS
DESCRIPTORS: CRAMPING
DESCRIPTORS: CRAMPING
DESCRIPTORS: BURNING;CRAMPING
DESCRIPTORS: CRAMPING;DISCOMFORT
DESCRIPTORS: BURNING;CRAMPING
DESCRIPTORS: DISCOMFORT

## 2024-10-10 ASSESSMENT — PAIN DESCRIPTION - ORIENTATION: ORIENTATION: LOWER

## 2024-10-10 NOTE — FLOWSHEET NOTE
Patient presents to triage stating she thinks her water broke around 12 am this morning. Pt. Denies contractions and vaginal bleeding. Pt. States LOF since 12 am. +FM
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
## 2024-10-10 NOTE — FLOWSHEET NOTE
Awaiting lab results, lab called to verify.  CBC to be resulting soon.  Dr. Gracia, Dr. Cheng notified.

## 2024-10-10 NOTE — ANESTHESIA PROCEDURE NOTES
Spinal Block    Patient location during procedure: OB  End time: 10/10/2024 4:08 AM  Reason for block: primary anesthetic  Staffing  Performed: resident/CRNA   Anesthesiologist: Robb Abdul MD  Resident/CRNA: Anjum Denney APRN - CRNA  Performed by: Anjum Denney APRN - CRNA  Authorized by: Robb Abdul MD    Spinal Block  Patient position: sitting  Prep: Betasept  Patient monitoring: continuous pulse ox and frequent blood pressure checks  Approach: midline  Location: L4/L5  Guidance: paresthesia technique  Provider prep: sterile gloves and sterile gown  Local infiltration: lidocaine  Needle  Needle type: Pencan   Needle gauge: 24 G  Needle length: 4 in  Assessment  Sensory level: T4  Swirl obtained: Yes  CSF: clear  Attempts: 1  Hemodynamics: stable  Preanesthetic Checklist  Completed: patient identified, IV checked, site marked, risks and benefits discussed, surgical/procedural consents, equipment checked, pre-op evaluation, timeout performed, anesthesia consent given, oxygen available, monitors applied/VS acknowledged, fire risk safety assessment completed and verbalized and blood product R/B/A discussed and consented

## 2024-10-10 NOTE — ANESTHESIA PRE PROCEDURE
103/74   11/21/22 102/60       NPO Status:  Ate breakfast around 9 AM today 4/28/2023                                                                               BMI:   Wt Readings from Last 3 Encounters:   04/24/23 65.8 kg (145 lb)   11/21/22 65.1 kg (143 lb 7 oz)     There is no height or weight on file to calculate BMI.    CBC:   Lab Results   Component Value Date/Time    WBC 10.4 04/29/2023 05:41 AM    RBC 3.07 04/29/2023 05:41 AM    HGB 9.5 04/29/2023 05:41 AM    HCT 28.4 04/29/2023 05:41 AM    MCV 92.5 04/29/2023 05:41 AM    RDW 13.2 04/29/2023 05:41 AM     04/29/2023 05:41 AM       CMP:   Lab Results   Component Value Date/Time     04/28/2023 06:47 AM    K 3.8 04/28/2023 06:47 AM     04/28/2023 06:47 AM    CO2 20 04/28/2023 06:47 AM    BUN 10 04/28/2023 06:47 AM    CREATININE 0.43 04/28/2023 06:47 AM    LABGLOM >60 04/28/2023 06:47 AM    GLUCOSE 74 04/28/2023 06:47 AM    CALCIUM 7.9 04/28/2023 06:47 AM       POC Tests: No results for input(s): \"POCGLU\", \"POCNA\", \"POCK\", \"POCCL\", \"POCBUN\", \"POCHEMO\", \"POCHCT\" in the last 72 hours.    Coags:   Lab Results   Component Value Date/Time    PROTIME 14.9 04/27/2023 04:33 PM    INR 1.2 04/27/2023 04:33 PM    APTT 31.7 04/27/2023 04:33 PM       HCG (If Applicable): No results found for: \"PREGTESTUR\", \"PREGSERUM\", \"HCG\", \"HCGQUANT\"     ABGs: No results found for: \"PHART\", \"PO2ART\", \"YQB1IUB\", \"GOY8XAN\", \"BEART\", \"T4LKLTVL\"     Type & Screen (If Applicable):  No results found for: \"LABABO\"    Drug/Infectious Status (If Applicable):  No results found for: \"HIV\", \"HEPCAB\"    COVID-19 Screening (If Applicable): No results found for: \"COVID19\"        Anesthesia Evaluation  Patient summary reviewed   no history of anesthetic complications:   Airway: Mallampati: II  TM distance: >3 FB   Neck ROM: full  Mouth opening: > = 3 FB   Dental:    (+) other      Pulmonary:Negative Pulmonary ROS and normal exam                               Cardiovascular:Negative

## 2024-10-10 NOTE — ANESTHESIA POSTPROCEDURE EVALUATION
Department of Anesthesiology  Postprocedure Note    Patient: Aleta Ovalle  MRN: [id number]  YOB: 1990  Date of evaluation: 10/10/2024    Procedure Summary       Date: 10/10/24 Room / Location: [address]    Anesthesia Start: 558 Anesthesia Stop: 724    Procedure:  SECTION (Abdomen) Diagnosis:       History of  delivery      (History of  delivery [Z98.891])    Surgeons: Aidan Gracia DO Responsible Provider: Robb Abdul MD    Anesthesia Type: spinal ASA Status: 3            Anesthesia Type: No value filed.    Jill Phase I: Jill Score: 9    Jill Phase II: Jill Score: 10    Anesthesia Post Evaluation    Patient location during evaluation: PACU  Patient participation: complete - patient participated  Level of consciousness: awake  Airway patency: patent  Nausea & Vomiting: no nausea and no vomiting  Cardiovascular status: blood pressure returned to baseline  Respiratory status: acceptable  Hydration status: euvolemic  Pain management: adequate    No notable events documented.
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
## 2024-10-10 NOTE — FLOWSHEET NOTE
Patient admitted to room 750 from L&D via transport bed.   Oriented to room and surroundings.  Plan of care reviewed.  Verbalized understanding.  Instructed on infant security and safe sleep practices.  Preventing falls education provided .The following handouts given: A New Beginning: Your Guide to Postpartum Care, Rounding,Babies Cry A lot, Safe Sleep, Security and Visitation Guidelines.   Call light placed within reach. NICU number given.

## 2024-10-10 NOTE — FLOWSHEET NOTE
RONEL Oviedo notified of need for C/S within the hour, case booked for 0530.  Patient grossly ruptured with a hx of classical C/S.  Labs being obtained with IV start at this time.    NICU charge notified of plan for C/S.  No prenatal care this pregnancy.

## 2024-10-10 NOTE — BRIEF OP NOTE
and cord gases  Instrument and Sponge Count: Correct  Complications: none  Condition: Infant stable, transfer to General Care Nursery, Mother stable, transfer to post anesthesia recovery.     See operative report for full details.    Alex Cheng MD  Ob/Gyn Resident  10/10/2024, 7:18 AM

## 2024-10-11 LAB
CHLAMYDIA DNA UR QL NAA+PROBE: NEGATIVE
ERYTHROCYTE [DISTWIDTH] IN BLOOD BY AUTOMATED COUNT: 14.1 % (ref 11.8–14.4)
HCT VFR BLD AUTO: 23.4 % (ref 36.3–47.1)
HGB BLD-MCNC: 7.5 G/DL (ref 11.9–15.1)
MCH RBC QN AUTO: 30.2 PG (ref 25.2–33.5)
MCHC RBC AUTO-ENTMCNC: 32.1 G/DL (ref 28.4–34.8)
MCV RBC AUTO: 94.4 FL (ref 82.6–102.9)
N GONORRHOEA DNA UR QL NAA+PROBE: NEGATIVE
NRBC BLD-RTO: 0 PER 100 WBC
PLATELET # BLD AUTO: 184 K/UL (ref 138–453)
PMV BLD AUTO: 10.3 FL (ref 8.1–13.5)
RBC # BLD AUTO: 2.48 M/UL (ref 3.95–5.11)
SPECIMEN DESCRIPTION: NORMAL
WBC OTHER # BLD: 8.5 K/UL (ref 3.5–11.3)

## 2024-10-11 PROCEDURE — 6370000000 HC RX 637 (ALT 250 FOR IP)

## 2024-10-11 PROCEDURE — 2580000003 HC RX 258

## 2024-10-11 PROCEDURE — 1220000000 HC SEMI PRIVATE OB R&B

## 2024-10-11 PROCEDURE — 6360000002 HC RX W HCPCS

## 2024-10-11 PROCEDURE — 36415 COLL VENOUS BLD VENIPUNCTURE: CPT

## 2024-10-11 PROCEDURE — 85027 COMPLETE CBC AUTOMATED: CPT

## 2024-10-11 RX ORDER — OXYCODONE HYDROCHLORIDE 5 MG/1
5 TABLET ORAL EVERY 6 HOURS PRN
Qty: 20 TABLET | Refills: 0 | Status: SHIPPED | OUTPATIENT
Start: 2024-10-11 | End: 2024-10-13

## 2024-10-11 RX ORDER — FERROUS SULFATE 325(65) MG
325 TABLET ORAL 2 TIMES DAILY
Qty: 325 TABLET | Refills: 1 | Status: SHIPPED | OUTPATIENT
Start: 2024-10-11 | End: 2024-10-13

## 2024-10-11 RX ORDER — SIMETHICONE 80 MG
80 TABLET,CHEWABLE ORAL 4 TIMES DAILY PRN
Qty: 90 TABLET | Refills: 1 | Status: SHIPPED | OUTPATIENT
Start: 2024-10-11 | End: 2024-10-13

## 2024-10-11 RX ADMIN — SODIUM CHLORIDE, PRESERVATIVE FREE 20 ML: 5 INJECTION INTRAVENOUS at 01:47

## 2024-10-11 RX ADMIN — ACETAMINOPHEN 1000 MG: 500 TABLET ORAL at 12:56

## 2024-10-11 RX ADMIN — OXYCODONE 10 MG: 5 TABLET ORAL at 21:33

## 2024-10-11 RX ADMIN — SODIUM CHLORIDE, PRESERVATIVE FREE 10 ML: 5 INJECTION INTRAVENOUS at 21:33

## 2024-10-11 RX ADMIN — KETOROLAC TROMETHAMINE 30 MG: 30 INJECTION, SOLUTION INTRAMUSCULAR; INTRAVENOUS at 01:46

## 2024-10-11 RX ADMIN — SIMETHICONE 80 MG: 80 TABLET, CHEWABLE ORAL at 15:29

## 2024-10-11 RX ADMIN — KETOROLAC TROMETHAMINE 30 MG: 30 INJECTION, SOLUTION INTRAMUSCULAR; INTRAVENOUS at 08:20

## 2024-10-11 RX ADMIN — SIMETHICONE 80 MG: 80 TABLET, CHEWABLE ORAL at 06:11

## 2024-10-11 RX ADMIN — SENNOSIDES AND DOCUSATE SODIUM 2 TABLET: 50; 8.6 TABLET ORAL at 08:20

## 2024-10-11 RX ADMIN — ACETAMINOPHEN 1000 MG: 500 TABLET ORAL at 06:11

## 2024-10-11 RX ADMIN — MAGNESIUM HYDROXIDE 30 ML: 400 SUSPENSION ORAL at 08:20

## 2024-10-11 RX ADMIN — ACETAMINOPHEN 1000 MG: 500 TABLET ORAL at 00:07

## 2024-10-11 RX ADMIN — OXYCODONE 5 MG: 5 TABLET ORAL at 12:57

## 2024-10-11 RX ADMIN — IBUPROFEN 600 MG: 600 TABLET, FILM COATED ORAL at 23:45

## 2024-10-11 RX ADMIN — IBUPROFEN 600 MG: 600 TABLET, FILM COATED ORAL at 17:17

## 2024-10-11 RX ADMIN — OXYCODONE 10 MG: 5 TABLET ORAL at 00:07

## 2024-10-11 RX ADMIN — ACETAMINOPHEN 1000 MG: 500 TABLET ORAL at 21:33

## 2024-10-11 RX ADMIN — OXYCODONE 5 MG: 5 TABLET ORAL at 17:17

## 2024-10-11 ASSESSMENT — PAIN SCALES - GENERAL
PAINLEVEL_OUTOF10: 5
PAINLEVEL_OUTOF10: 7
PAINLEVEL_OUTOF10: 4
PAINLEVEL_OUTOF10: 6
PAINLEVEL_OUTOF10: 4
PAINLEVEL_OUTOF10: 7

## 2024-10-11 ASSESSMENT — PAIN DESCRIPTION - DESCRIPTORS
DESCRIPTORS: ACHING;SORE
DESCRIPTORS: ACHING;SORE
DESCRIPTORS: ACHING
DESCRIPTORS: CRAMPING

## 2024-10-11 ASSESSMENT — PAIN - FUNCTIONAL ASSESSMENT: PAIN_FUNCTIONAL_ASSESSMENT: ACTIVITIES ARE NOT PREVENTED

## 2024-10-11 ASSESSMENT — PAIN DESCRIPTION - ORIENTATION
ORIENTATION: MID;LOWER
ORIENTATION: LOWER;MID
ORIENTATION: MID;LOWER
ORIENTATION: MID;LOWER

## 2024-10-11 ASSESSMENT — PAIN DESCRIPTION - LOCATION
LOCATION: ABDOMEN

## 2024-10-11 NOTE — PLAN OF CARE
Problem: Pain  Goal: Verbalizes/displays adequate comfort level or baseline comfort level  Outcome: Progressing     Problem: Infection - Adult  Goal: Absence of infection at discharge  Outcome: Progressing  Goal: Absence of infection during hospitalization  Outcome: Progressing  Goal: Absence of fever/infection during anticipated neutropenic period  Outcome: Progressing     Problem: Safety - Adult  Goal: Free from fall injury  Outcome: Progressing     Problem: Postpartum  Goal: Experiences normal postpartum course  Description:  Postpartum OB-Pregnancy care plan goal which identifies if the mother is experiencing a normal postpartum course  Outcome: Progressing  Goal: Appropriate maternal -  bonding  Description:  Postpartum OB-Pregnancy care plan goal which identifies if the mother and  are bonding appropriately  Outcome: Progressing  Goal: Establishment of infant feeding pattern  Description:  Postpartum OB-Pregnancy care plan goal which identifies if the mother is establishing a feeding pattern with their   Outcome: Progressing  Goal: Incisions, wounds, or drain sites healing without S/S of infection  Outcome: Progressing     Problem: Discharge Planning  Goal: Discharge to home or other facility with appropriate resources  Outcome: Progressing

## 2024-10-11 NOTE — PLAN OF CARE
Problem: Pain  Goal: Verbalizes/displays adequate comfort level or baseline comfort level  10/11/2024 1756 by Puja Liriano RN  Outcome: Progressing  10/11/2024 0512 by Tila Saenz RN  Outcome: Progressing     Problem: Infection - Adult  Goal: Absence of infection at discharge  10/11/2024 1756 by Puja Liriano RN  Outcome: Progressing  10/11/2024 0512 by Tila Saenz RN  Outcome: Progressing  Goal: Absence of infection during hospitalization  10/11/2024 1756 by Puja Liriano RN  Outcome: Progressing  10/11/2024 0512 by Tila Saenz RN  Outcome: Progressing  Goal: Absence of fever/infection during anticipated neutropenic period  10/11/2024 1756 by Puja Liriano RN  Outcome: Progressing  10/11/2024 0512 by Tila Saenz RN  Outcome: Progressing     Problem: Safety - Adult  Goal: Free from fall injury  10/11/2024 1756 by Puja Liriano RN  Outcome: Progressing  10/11/2024 0512 by Tila Saenz RN  Outcome: Progressing     Problem: Postpartum  Goal: Experiences normal postpartum course  Description:  Postpartum OB-Pregnancy care plan goal which identifies if the mother is experiencing a normal postpartum course  10/11/2024 1756 by Puja Liriano RN  Outcome: Progressing  10/11/2024 0512 by Tila Saenz RN  Outcome: Progressing  Goal: Appropriate maternal -  bonding  Description:  Postpartum OB-Pregnancy care plan goal which identifies if the mother and  are bonding appropriately  10/11/2024 1756 by Puja Liriano RN  Outcome: Progressing  10/11/2024 0512 by Tila Saenz RN  Outcome: Progressing  Goal: Establishment of infant feeding pattern  Description:  Postpartum OB-Pregnancy care plan goal which identifies if the mother is establishing a feeding pattern with their   10/11/2024 1756 by Puja Liriano RN  Outcome: Progressing  10/11/2024 0512 by Tila Saenz RN  Outcome: Progressing  Goal: Incisions, wounds, or drain sites

## 2024-10-12 PROCEDURE — 6370000000 HC RX 637 (ALT 250 FOR IP)

## 2024-10-12 PROCEDURE — 1220000000 HC SEMI PRIVATE OB R&B

## 2024-10-12 RX ORDER — POLYETHYLENE GLYCOL 3350 17 G/17G
17 POWDER, FOR SOLUTION ORAL DAILY PRN
Status: DISCONTINUED | OUTPATIENT
Start: 2024-10-12 | End: 2024-10-13 | Stop reason: HOSPADM

## 2024-10-12 RX ORDER — LANOLIN ALCOHOL/MO/W.PET/CERES
3 CREAM (GRAM) TOPICAL NIGHTLY PRN
Status: DISCONTINUED | OUTPATIENT
Start: 2024-10-12 | End: 2024-10-13 | Stop reason: HOSPADM

## 2024-10-12 RX ORDER — SENNA AND DOCUSATE SODIUM 50; 8.6 MG/1; MG/1
2 TABLET, FILM COATED ORAL 2 TIMES DAILY PRN
Status: DISCONTINUED | OUTPATIENT
Start: 2024-10-12 | End: 2024-10-13 | Stop reason: HOSPADM

## 2024-10-12 RX ADMIN — MAGNESIUM HYDROXIDE 30 ML: 400 SUSPENSION ORAL at 14:29

## 2024-10-12 RX ADMIN — IBUPROFEN 600 MG: 600 TABLET, FILM COATED ORAL at 22:07

## 2024-10-12 RX ADMIN — Medication 3 MG: at 01:32

## 2024-10-12 RX ADMIN — IBUPROFEN 600 MG: 600 TABLET, FILM COATED ORAL at 14:29

## 2024-10-12 RX ADMIN — SIMETHICONE 80 MG: 80 TABLET, CHEWABLE ORAL at 22:08

## 2024-10-12 RX ADMIN — SENNOSIDES AND DOCUSATE SODIUM 2 TABLET: 50; 8.6 TABLET ORAL at 07:17

## 2024-10-12 RX ADMIN — ACETAMINOPHEN 1000 MG: 500 TABLET ORAL at 20:14

## 2024-10-12 RX ADMIN — SIMETHICONE 80 MG: 80 TABLET, CHEWABLE ORAL at 14:29

## 2024-10-12 RX ADMIN — OXYCODONE 5 MG: 5 TABLET ORAL at 01:31

## 2024-10-12 RX ADMIN — IBUPROFEN 600 MG: 600 TABLET, FILM COATED ORAL at 07:16

## 2024-10-12 RX ADMIN — OXYCODONE 5 MG: 5 TABLET ORAL at 12:50

## 2024-10-12 RX ADMIN — OXYCODONE 10 MG: 5 TABLET ORAL at 20:14

## 2024-10-12 RX ADMIN — ACETAMINOPHEN 1000 MG: 500 TABLET ORAL at 12:48

## 2024-10-12 RX ADMIN — ACETAMINOPHEN 1000 MG: 500 TABLET ORAL at 03:35

## 2024-10-12 ASSESSMENT — PAIN SCALES - GENERAL
PAINLEVEL_OUTOF10: 3
PAINLEVEL_OUTOF10: 4
PAINLEVEL_OUTOF10: 2
PAINLEVEL_OUTOF10: 3
PAINLEVEL_OUTOF10: 1
PAINLEVEL_OUTOF10: 4
PAINLEVEL_OUTOF10: 5
PAINLEVEL_OUTOF10: 5
PAINLEVEL_OUTOF10: 7

## 2024-10-12 ASSESSMENT — PAIN DESCRIPTION - ORIENTATION
ORIENTATION: MID;ANTERIOR
ORIENTATION: MID;LOWER
ORIENTATION: LOWER;MID

## 2024-10-12 ASSESSMENT — PAIN DESCRIPTION - LOCATION
LOCATION: ABDOMEN

## 2024-10-12 ASSESSMENT — PAIN DESCRIPTION - DESCRIPTORS
DESCRIPTORS: CRAMPING
DESCRIPTORS: ACHING
DESCRIPTORS: CRAMPING
DESCRIPTORS: CRAMPING
DESCRIPTORS: ACHING;SORE
DESCRIPTORS: CRAMPING

## 2024-10-12 ASSESSMENT — PAIN - FUNCTIONAL ASSESSMENT
PAIN_FUNCTIONAL_ASSESSMENT: ACTIVITIES ARE NOT PREVENTED

## 2024-10-12 NOTE — PLAN OF CARE
Problem: Pain  Goal: Verbalizes/displays adequate comfort level or baseline comfort level  10/12/2024 0552 by Gail Morton RN  Outcome: Progressing  10/11/2024 175 by Puja Liriano RN  Outcome: Progressing     Problem: Infection - Adult  Goal: Absence of infection at discharge  10/12/2024 0552 by Gail Morton RN  Outcome: Progressing  10/11/2024 175 by Puja Liriano RN  Outcome: Progressing  Goal: Absence of infection during hospitalization  10/12/2024 0552 by Gail Morton RN  Outcome: Progressing  10/11/2024 175 by Puja Liriano RN  Outcome: Progressing  Goal: Absence of fever/infection during anticipated neutropenic period  10/12/2024 0552 by Gail Morton RN  Outcome: Progressing  10/11/2024 175 by Puja Liriano RN  Outcome: Progressing     Problem: Safety - Adult  Goal: Free from fall injury  10/12/2024 0552 by Gail Morton RN  Outcome: Progressing  10/11/2024 175 by Puja Liriano RN  Outcome: Progressing     Problem: Postpartum  Goal: Experiences normal postpartum course  Description:  Postpartum OB-Pregnancy care plan goal which identifies if the mother is experiencing a normal postpartum course  10/12/2024 0552 by Gail Morton RN  Outcome: Progressing  10/11/2024 1756 by Puja Liriano RN  Outcome: Progressing  Goal: Appropriate maternal -  bonding  Description:  Postpartum OB-Pregnancy care plan goal which identifies if the mother and  are bonding appropriately  10/12/2024 0552 by Gail Morton RN  Outcome: Progressing  10/11/2024 175 by Puja Liriano RN  Outcome: Progressing  Goal: Establishment of infant feeding pattern  Description:  Postpartum OB-Pregnancy care plan goal which identifies if the mother is establishing a feeding pattern with their   10/12/2024 0552 by Gail Morton RN  Outcome: Progressing  10/11/2024 1756 by Puja Liriano RN  Outcome: Progressing  Goal:

## 2024-10-13 VITALS
TEMPERATURE: 98.4 F | DIASTOLIC BLOOD PRESSURE: 66 MMHG | RESPIRATION RATE: 16 BRPM | SYSTOLIC BLOOD PRESSURE: 119 MMHG | OXYGEN SATURATION: 99 % | HEART RATE: 65 BPM

## 2024-10-13 PROBLEM — O09.90 HIGH-RISK PREGNANCY SUPERVISION, UNSPECIFIED TRIMESTER: Status: RESOLVED | Noted: 2024-10-10 | Resolved: 2024-10-13

## 2024-10-13 PROBLEM — O41.00X0 OLIGOHYDRAMNIOS, ANTEPARTUM: Status: RESOLVED | Noted: 2023-04-26 | Resolved: 2024-10-13

## 2024-10-13 PROBLEM — Z30.430 ENCOUNTER FOR IUD INSERTION: Status: RESOLVED | Noted: 2024-10-10 | Resolved: 2024-10-13

## 2024-10-13 PROBLEM — O36.5920 INTRAUTERINE GROWTH RESTRICTION (IUGR) AFFECTING CARE OF MOTHER, SECOND TRIMESTER, SINGLE GESTATION: Status: RESOLVED | Noted: 2023-04-26 | Resolved: 2024-10-13

## 2024-10-13 PROBLEM — O42.919 PRETERM PREMATURE RUPTURE OF MEMBRANES (PPROM) WITH UNKNOWN ONSET OF LABOR: Status: RESOLVED | Noted: 2023-04-26 | Resolved: 2024-10-13

## 2024-10-13 PROBLEM — Z3A.36 36 WEEKS GESTATION OF PREGNANCY: Status: RESOLVED | Noted: 2024-10-10 | Resolved: 2024-10-13

## 2024-10-13 PROBLEM — O09.899 SHORT INTERVAL BETWEEN PREGNANCIES COMPLICATING PREGNANCY, ANTEPARTUM: Status: RESOLVED | Noted: 2023-04-26 | Resolved: 2024-10-13

## 2024-10-13 PROCEDURE — 6370000000 HC RX 637 (ALT 250 FOR IP)

## 2024-10-13 RX ORDER — SIMETHICONE 80 MG
80 TABLET,CHEWABLE ORAL 4 TIMES DAILY PRN
Qty: 90 TABLET | Refills: 1 | Status: SHIPPED | OUTPATIENT
Start: 2024-10-13

## 2024-10-13 RX ORDER — IBUPROFEN 600 MG/1
600 TABLET, FILM COATED ORAL EVERY 6 HOURS PRN
Qty: 40 TABLET | Refills: 1 | Status: SHIPPED | OUTPATIENT
Start: 2024-10-13

## 2024-10-13 RX ORDER — ACETAMINOPHEN 500 MG
1000 TABLET ORAL EVERY 6 HOURS PRN
Qty: 60 TABLET | Refills: 1 | Status: SHIPPED | OUTPATIENT
Start: 2024-10-13

## 2024-10-13 RX ORDER — LOPERAMIDE HCL 2 MG
2 CAPSULE ORAL ONCE
Status: COMPLETED | OUTPATIENT
Start: 2024-10-13 | End: 2024-10-13

## 2024-10-13 RX ORDER — OXYCODONE HYDROCHLORIDE 5 MG/1
5 TABLET ORAL EVERY 6 HOURS PRN
Qty: 20 TABLET | Refills: 0 | Status: SHIPPED | OUTPATIENT
Start: 2024-10-13 | End: 2024-10-18

## 2024-10-13 RX ORDER — ONDANSETRON 4 MG/1
4 TABLET, FILM COATED ORAL 3 TIMES DAILY PRN
Qty: 15 TABLET | Refills: 0 | Status: SHIPPED | OUTPATIENT
Start: 2024-10-13

## 2024-10-13 RX ORDER — FERROUS SULFATE 325(65) MG
325 TABLET ORAL 2 TIMES DAILY
Qty: 325 TABLET | Refills: 1 | Status: SHIPPED | OUTPATIENT
Start: 2024-10-13

## 2024-10-13 RX ORDER — SENNA AND DOCUSATE SODIUM 50; 8.6 MG/1; MG/1
1 TABLET, FILM COATED ORAL 2 TIMES DAILY
Qty: 60 TABLET | Refills: 0 | Status: SHIPPED | OUTPATIENT
Start: 2024-10-13

## 2024-10-13 RX ADMIN — LOPERAMIDE HYDROCHLORIDE 2 MG: 2 CAPSULE ORAL at 00:18

## 2024-10-13 RX ADMIN — OXYCODONE 5 MG: 5 TABLET ORAL at 04:06

## 2024-10-13 RX ADMIN — IBUPROFEN 600 MG: 600 TABLET, FILM COATED ORAL at 13:07

## 2024-10-13 RX ADMIN — ACETAMINOPHEN 1000 MG: 500 TABLET ORAL at 04:05

## 2024-10-13 RX ADMIN — SIMETHICONE 80 MG: 80 TABLET, CHEWABLE ORAL at 04:05

## 2024-10-13 RX ADMIN — Medication 3 MG: at 00:18

## 2024-10-13 RX ADMIN — ACETAMINOPHEN 1000 MG: 500 TABLET ORAL at 13:07

## 2024-10-13 RX ADMIN — OXYCODONE 10 MG: 5 TABLET ORAL at 00:18

## 2024-10-13 RX ADMIN — IBUPROFEN 600 MG: 600 TABLET, FILM COATED ORAL at 04:05

## 2024-10-13 ASSESSMENT — PAIN SCALES - GENERAL
PAINLEVEL_OUTOF10: 8
PAINLEVEL_OUTOF10: 4
PAINLEVEL_OUTOF10: 2
PAINLEVEL_OUTOF10: 4
PAINLEVEL_OUTOF10: 5
PAINLEVEL_OUTOF10: 2

## 2024-10-13 ASSESSMENT — PAIN DESCRIPTION - LOCATION
LOCATION: ABDOMEN
LOCATION: ABDOMEN

## 2024-10-13 ASSESSMENT — PAIN DESCRIPTION - ORIENTATION
ORIENTATION: MID
ORIENTATION: LOWER;MID

## 2024-10-13 ASSESSMENT — PAIN - FUNCTIONAL ASSESSMENT: PAIN_FUNCTIONAL_ASSESSMENT: ACTIVITIES ARE NOT PREVENTED

## 2024-10-13 ASSESSMENT — PAIN DESCRIPTION - DESCRIPTORS
DESCRIPTORS: CRAMPING
DESCRIPTORS: ACHING
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
## 2024-10-13 NOTE — FLOWSHEET NOTE
Discharge instructions given, all questions answered.  I have reviewed all [facility] Post-Birth Warning Signs and essential teaching points for pulmonary embolism, cardiac disease, hypertensive disorders of pregnancy, obstetric hemorrhage, venous thromboembolism, infection, and postpartum depression with the patient. I have informed the patient on when to call their healthcare provider and when to call 911. I have discussed with the patient  the importance of scheduling a follow-up visit with their physician, nurse practitioner or midwife and provided them with correct contact information for appointment. I have provided the patient with a copy of the \"Save Your Life\" handout. The patient has acknowledged receiving and understanding this education with her signature.

## 2024-10-13 NOTE — PLAN OF CARE
Problem: Pain  Goal: Verbalizes/displays adequate comfort level or baseline comfort level  Outcome: Progressing     Problem: Infection - Adult  Goal: Absence of infection at discharge  Outcome: Progressing  Goal: Absence of infection during hospitalization  Outcome: Progressing  Goal: Absence of fever/infection during anticipated neutropenic period  Outcome: Progressing     Problem: Safety - Adult  Goal: Free from fall injury  Outcome: Progressing     Problem: Postpartum  Goal: Experiences normal postpartum course  Description:  Postpartum OB-Pregnancy care plan goal which identifies if the mother is experiencing a normal postpartum course  Outcome: Progressing  Goal: Appropriate maternal -  bonding  Description:  Postpartum OB-Pregnancy care plan goal which identifies if the mother and  are bonding appropriately  Outcome: Progressing  Goal: Establishment of infant feeding pattern  Description:  Postpartum OB-Pregnancy care plan goal which identifies if the mother is establishing a feeding pattern with their   Outcome: Progressing  Goal: Incisions, wounds, or drain sites healing without S/S of infection  Outcome: Progressing     Problem: Discharge Planning  Goal: Discharge to home or other facility with appropriate resources  Outcome: Progressing     Problem: ABCDS Injury Assessment  Goal: Absence of physical injury  Outcome: Progressing

## 2024-10-13 NOTE — PLAN OF CARE
Problem: Pain  Goal: Verbalizes/displays adequate comfort level or baseline comfort level  10/13/2024 1325 by Puja Liriano RN  Outcome: Adequate for Discharge  10/13/2024 040 by Gail Morton RN  Outcome: Progressing     Problem: Infection - Adult  Goal: Absence of infection at discharge  10/13/2024 1325 by Puja Liriano RN  Outcome: Adequate for Discharge  10/13/2024 040 by Gail Morton RN  Outcome: Progressing  Goal: Absence of infection during hospitalization  10/13/2024 1325 by Puja Liriano RN  Outcome: Adequate for Discharge  10/13/2024 040 by Gail Morton RN  Outcome: Progressing  Goal: Absence of fever/infection during anticipated neutropenic period  10/13/2024 1325 by Puja Liriano RN  Outcome: Adequate for Discharge  10/13/2024 040 by Gail Morton RN  Outcome: Progressing     Problem: Safety - Adult  Goal: Free from fall injury  10/13/2024 1325 by Puja Liriano RN  Outcome: Adequate for Discharge  10/13/2024 0401 by Gail Morton RN  Outcome: Progressing     Problem: Postpartum  Goal: Experiences normal postpartum course  Description:  Postpartum OB-Pregnancy care plan goal which identifies if the mother is experiencing a normal postpartum course  10/13/2024 1325 by Puja Liriano RN  Outcome: Adequate for Discharge  10/13/2024 040 by Gail Morton RN  Outcome: Progressing  Goal: Appropriate maternal -  bonding  Description:  Postpartum OB-Pregnancy care plan goal which identifies if the mother and  are bonding appropriately  10/13/2024 1325 by Puja Liriano RN  Outcome: Adequate for Discharge  10/13/2024 040 by Gail Morton RN  Outcome: Progressing  Goal: Establishment of infant feeding pattern  Description:  Postpartum OB-Pregnancy care plan goal which identifies if the mother is establishing a feeding pattern with their   10/13/2024 1325 by Puja Liriano RN  Outcome:
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
## 2024-10-15 LAB — SURGICAL PATHOLOGY REPORT: NORMAL

## (undated) DEVICE — TRAY SPNL 24GA L4IN PENCAN PNCL PNT NDL 0.75% BIPIVCAIN W/

## (undated) DEVICE — SUTURE PLN GUT SZ 3-0 L27IN ABSRB YELLOWISH TAN L36MM CT-1 842H

## (undated) DEVICE — SUTURE VCRL 3-0 L36IN ABSRB VLT CT-1 L36MM 1/2 CIR J344H

## (undated) DEVICE — SUTURE MCRYL SZ 0 L36IN ABSRB VLT L48MM CTX 1/2 CIR Y398H